# Patient Record
Sex: MALE | Race: WHITE | Employment: FULL TIME | URBAN - METROPOLITAN AREA
[De-identification: names, ages, dates, MRNs, and addresses within clinical notes are randomized per-mention and may not be internally consistent; named-entity substitution may affect disease eponyms.]

---

## 2020-02-02 ENCOUNTER — HOSPITAL ENCOUNTER (EMERGENCY)
Age: 30
Discharge: HOME | End: 2020-02-02
Attending: SPECIALIST
Payer: COMMERCIAL

## 2020-02-02 VITALS
HEIGHT: 76 IN | TEMPERATURE: 100.4 F | WEIGHT: 240 LBS | HEART RATE: 109 BPM | BODY MASS INDEX: 29.22 KG/M2 | OXYGEN SATURATION: 96 % | SYSTOLIC BLOOD PRESSURE: 135 MMHG | RESPIRATION RATE: 14 BRPM | DIASTOLIC BLOOD PRESSURE: 87 MMHG

## 2020-02-02 LAB
-: ABNORMAL
ABSOLUTE EOS #: 0 K/UL (ref 0–0.4)
ABSOLUTE IMMATURE GRANULOCYTE: ABNORMAL K/UL (ref 0–0.3)
ABSOLUTE LYMPH #: 1 K/UL (ref 1–4.8)
ABSOLUTE MONO #: 1.2 K/UL (ref 0.1–1.2)
ALBUMIN SERPL-MCNC: 4 G/DL (ref 3.5–5.2)
ALBUMIN/GLOBULIN RATIO: 1.1 (ref 1–2.5)
ALP BLD-CCNC: 60 U/L (ref 40–129)
ALT SERPL-CCNC: 16 U/L (ref 5–41)
AMORPHOUS: ABNORMAL
ANION GAP SERPL CALCULATED.3IONS-SCNC: 14 MMOL/L (ref 9–17)
AST SERPL-CCNC: 15 U/L
BACTERIA: ABNORMAL
BASOPHILS # BLD: 0 % (ref 0–2)
BASOPHILS ABSOLUTE: 0 K/UL (ref 0–0.2)
BILIRUB SERPL-MCNC: 1.06 MG/DL (ref 0.3–1.2)
BILIRUBIN URINE: NEGATIVE
BUN BLDV-MCNC: 9 MG/DL (ref 6–20)
BUN/CREAT BLD: ABNORMAL (ref 9–20)
CALCIUM SERPL-MCNC: 8.9 MG/DL (ref 8.6–10.4)
CASTS UA: ABNORMAL /LPF
CHLORIDE BLD-SCNC: 102 MMOL/L (ref 98–107)
CO2: 21 MMOL/L (ref 20–31)
COLOR: YELLOW
COMMENT UA: ABNORMAL
CREAT SERPL-MCNC: 1.17 MG/DL (ref 0.7–1.2)
CRYSTALS, UA: ABNORMAL /HPF
DIFFERENTIAL TYPE: ABNORMAL
DIRECT EXAM: NORMAL
EOSINOPHILS RELATIVE PERCENT: 0 % (ref 1–4)
EPITHELIAL CELLS UA: ABNORMAL /HPF (ref 0–5)
GFR AFRICAN AMERICAN: >60 ML/MIN
GFR NON-AFRICAN AMERICAN: >60 ML/MIN
GFR SERPL CREATININE-BSD FRML MDRD: ABNORMAL ML/MIN/{1.73_M2}
GFR SERPL CREATININE-BSD FRML MDRD: ABNORMAL ML/MIN/{1.73_M2}
GLUCOSE BLD-MCNC: 119 MG/DL (ref 70–99)
GLUCOSE URINE: NEGATIVE
HCT VFR BLD CALC: 48.1 % (ref 41–53)
HEMOGLOBIN: 16 G/DL (ref 13.5–17.5)
IMMATURE GRANULOCYTES: ABNORMAL %
KETONES, URINE: ABNORMAL
LEUKOCYTE ESTERASE, URINE: NEGATIVE
LYMPHOCYTES # BLD: 11 % (ref 24–44)
Lab: NORMAL
MCH RBC QN AUTO: 26.8 PG (ref 26–34)
MCHC RBC AUTO-ENTMCNC: 33.2 G/DL (ref 31–37)
MCV RBC AUTO: 80.6 FL (ref 80–100)
MONOCYTES # BLD: 13 % (ref 2–11)
MUCUS: ABNORMAL
NITRITE, URINE: NEGATIVE
NRBC AUTOMATED: ABNORMAL PER 100 WBC
OTHER OBSERVATIONS UA: ABNORMAL
PDW BLD-RTO: 13.8 % (ref 12.5–15.4)
PH UA: 6 (ref 5–8)
PLATELET # BLD: 227 K/UL (ref 140–450)
PLATELET ESTIMATE: ABNORMAL
PMV BLD AUTO: 9.2 FL (ref 6–12)
POTASSIUM SERPL-SCNC: 3.7 MMOL/L (ref 3.7–5.3)
PROTEIN UA: ABNORMAL
RBC # BLD: 5.97 M/UL (ref 4.5–5.9)
RBC # BLD: ABNORMAL 10*6/UL
RBC UA: ABNORMAL /HPF (ref 0–2)
RENAL EPITHELIAL, UA: ABNORMAL /HPF
SEG NEUTROPHILS: 76 % (ref 36–66)
SEGMENTED NEUTROPHILS ABSOLUTE COUNT: 7.3 K/UL (ref 1.8–7.7)
SODIUM BLD-SCNC: 137 MMOL/L (ref 135–144)
SPECIFIC GRAVITY UA: 1.01 (ref 1–1.03)
SPECIMEN DESCRIPTION: NORMAL
TOTAL PROTEIN: 7.8 G/DL (ref 6.4–8.3)
TRICHOMONAS: ABNORMAL
TURBIDITY: CLEAR
URINE HGB: ABNORMAL
UROBILINOGEN, URINE: NORMAL
WBC # BLD: 9.6 K/UL (ref 3.5–11)
WBC # BLD: ABNORMAL 10*3/UL
WBC UA: ABNORMAL /HPF (ref 0–5)
YEAST: ABNORMAL

## 2020-02-02 PROCEDURE — 36415 COLL VENOUS BLD VENIPUNCTURE: CPT

## 2020-02-02 PROCEDURE — 96374 THER/PROPH/DIAG INJ IV PUSH: CPT

## 2020-02-02 PROCEDURE — 96361 HYDRATE IV INFUSION ADD-ON: CPT

## 2020-02-02 PROCEDURE — 85025 COMPLETE CBC W/AUTO DIFF WBC: CPT

## 2020-02-02 PROCEDURE — 81001 URINALYSIS AUTO W/SCOPE: CPT

## 2020-02-02 PROCEDURE — 2580000003 HC RX 258: Performed by: SPECIALIST

## 2020-02-02 PROCEDURE — 99284 EMERGENCY DEPT VISIT MOD MDM: CPT

## 2020-02-02 PROCEDURE — 80053 COMPREHEN METABOLIC PANEL: CPT

## 2020-02-02 PROCEDURE — 6360000002 HC RX W HCPCS: Performed by: SPECIALIST

## 2020-02-02 PROCEDURE — 87804 INFLUENZA ASSAY W/OPTIC: CPT

## 2020-02-02 RX ORDER — 0.9 % SODIUM CHLORIDE 0.9 %
1000 INTRAVENOUS SOLUTION INTRAVENOUS ONCE
Status: COMPLETED | OUTPATIENT
Start: 2020-02-02 | End: 2020-02-02

## 2020-02-02 RX ORDER — ONDANSETRON 2 MG/ML
4 INJECTION INTRAMUSCULAR; INTRAVENOUS ONCE
Status: COMPLETED | OUTPATIENT
Start: 2020-02-02 | End: 2020-02-02

## 2020-02-02 RX ORDER — ONDANSETRON 4 MG/1
4 TABLET, ORALLY DISINTEGRATING ORAL EVERY 8 HOURS PRN
Qty: 12 TABLET | Refills: 0 | Status: SHIPPED | OUTPATIENT
Start: 2020-02-02 | End: 2021-06-02

## 2020-02-02 RX ADMIN — SODIUM CHLORIDE 1000 ML: 9 INJECTION, SOLUTION INTRAVENOUS at 15:00

## 2020-02-02 RX ADMIN — ONDANSETRON 4 MG: 2 INJECTION INTRAMUSCULAR; INTRAVENOUS at 13:15

## 2020-02-02 RX ADMIN — SODIUM CHLORIDE 1000 ML: 9 INJECTION, SOLUTION INTRAVENOUS at 13:14

## 2020-02-02 ASSESSMENT — PAIN DESCRIPTION - LOCATION: LOCATION: ABDOMEN

## 2020-02-02 ASSESSMENT — ENCOUNTER SYMPTOMS
DIARRHEA: 1
ABDOMINAL PAIN: 1
BLOOD IN STOOL: 0
VOMITING: 0
NAUSEA: 1

## 2020-02-02 ASSESSMENT — PAIN SCALES - GENERAL: PAINLEVEL_OUTOF10: 5

## 2020-02-02 ASSESSMENT — PAIN DESCRIPTION - PAIN TYPE: TYPE: ACUTE PAIN

## 2020-02-02 ASSESSMENT — PAIN DESCRIPTION - DESCRIPTORS: DESCRIPTORS: CRAMPING

## 2020-02-02 NOTE — ED PROVIDER NOTES
film images such as CT, Ultrasound and MRI are read by the radiologist. Plain radiographic images are visualized and the radiologist interpretations are reviewed as follows: No results found. LABS:  Results for orders placed or performed during the hospital encounter of 02/02/20   Rapid Influenza A/B Antigens   Result Value Ref Range    Specimen Description . NASOPHARYNGEAL SWAB     Special Requests NOT REPORTED     Direct Exam       PRESUMPTIVE NEGATIVE for Influenza A + B antigens. PCR testing to confirm this result is available upon request.  Specimen will be saved in the laboratory for 7 days. Please call [phone] if PCR testing is indicated.    CBC Auto Differential   Result Value Ref Range    WBC 9.6 3.5 - 11.0 k/uL    RBC 5.97 (H) 4.5 - 5.9 m/uL    Hemoglobin 16.0 13.5 - 17.5 g/dL    Hematocrit 48.1 41 - 53 %    MCV 80.6 80 - 100 fL    MCH 26.8 26 - 34 pg    MCHC 33.2 31 - 37 g/dL    RDW 13.8 12.5 - 15.4 %    Platelets 413 549 - 155 k/uL    MPV 9.2 6.0 - 12.0 fL    NRBC Automated NOT REPORTED per 100 WBC    Differential Type NOT REPORTED     Seg Neutrophils 76 (H) 36 - 66 %    Lymphocytes 11 (L) 24 - 44 %    Monocytes 13 (H) 2 - 11 %    Eosinophils % 0 (L) 1 - 4 %    Basophils 0 0 - 2 %    Immature Granulocytes NOT REPORTED 0 %    Segs Absolute 7.30 1.8 - 7.7 k/uL    Absolute Lymph # 1.00 1.0 - 4.8 k/uL    Absolute Mono # 1.20 0.1 - 1.2 k/uL    Absolute Eos # 0.00 0.0 - 0.4 k/uL    Basophils Absolute 0.00 0.0 - 0.2 k/uL    Absolute Immature Granulocyte NOT REPORTED 0.00 - 0.30 k/uL    WBC Morphology NOT REPORTED     RBC Morphology NOT REPORTED     Platelet Estimate NOT REPORTED    Comprehensive Metabolic Panel w/ Reflex to MG   Result Value Ref Range    Glucose 119 (H) 70 - 99 mg/dL    BUN 9 6 - 20 mg/dL    CREATININE 1.17 0.70 - 1.20 mg/dL    Bun/Cre Ratio NOT REPORTED 9 - 20    Calcium 8.9 8.6 - 10.4 mg/dL    Sodium 137 135 - 144 mmol/L    Potassium 3.7 3.7 - 5.3 mmol/L    Chloride 102 98 - 107 mmol/L    CO2 21 20 - 31 mmol/L    Anion Gap 14 9 - 17 mmol/L    Alkaline Phosphatase 60 40 - 129 U/L    ALT 16 5 - 41 U/L    AST 15 <40 U/L    Total Bilirubin 1.06 0.3 - 1.2 mg/dL    Total Protein 7.8 6.4 - 8.3 g/dL    Alb 4.0 3.5 - 5.2 g/dL    Albumin/Globulin Ratio 1.1 1.0 - 2.5    GFR Non-African American >60 >60 mL/min    GFR African American >60 >60 mL/min    GFR Comment          GFR Staging NOT REPORTED    Urinalysis Reflex to Culture   Result Value Ref Range    Color, UA YELLOW YELLOW    Turbidity UA CLEAR CLEAR    Glucose, Ur NEGATIVE NEGATIVE    Bilirubin Urine NEGATIVE NEGATIVE    Ketones, Urine SMALL (A) NEGATIVE    Specific Gravity, UA 1.006 1.005 - 1.030    Urine Hgb MODERATE (A) NEGATIVE    pH, UA 6.0 5.0 - 8.0    Protein, UA 1+ (A) NEGATIVE    Urobilinogen, Urine Normal Normal    Nitrite, Urine NEGATIVE NEGATIVE    Leukocyte Esterase, Urine NEGATIVE NEGATIVE    Urinalysis Comments NOT REPORTED    Microscopic Urinalysis   Result Value Ref Range    -          WBC, UA 2 TO 5 0 - 5 /HPF    RBC, UA 5 TO 10 0 - 2 /HPF    Casts UA NOT REPORTED /LPF    Crystals UA NOT REPORTED None /HPF    Epithelial Cells UA None 0 - 5 /HPF    Renal Epithelial, Urine NOT REPORTED 0 /HPF    Bacteria, UA FEW (A) None    Mucus, UA NOT REPORTED None    Trichomonas, UA NOT REPORTED None    Amorphous, UA 1+ (A) None    Other Observations UA (A) NOT REQ. Utilizing a urinalysis as the only screening method to exclude a potential uropathogen can be unreliable in many patient populations. Rapid screening tests are less sensitive than culture and if UTI is a clinical possibility, culture should be considered despite a negative urinalysis.     Yeast, UA NOT REPORTED None       I reviewed all the lab results and these are unremarkable    EMERGENCY DEPARTMENT COURSE:   Vitals:    Vitals:    02/02/20 1208   BP: 135/87   Pulse: 109   Resp: 14   Temp: 100.4 °F (38 °C)   TempSrc: Oral   SpO2: 96%   Weight:

## 2020-02-02 NOTE — ED NOTES
Patient cleared for discharge. Patient discharge instructions explained, Rx given and explained to patient. Patient verbalized understanding of all instructions and all patient questions answered to their satisfaction. Patient departs in stable condition.         Miranda Moritz, RN  02/02/20 2527

## 2020-02-02 NOTE — ED NOTES
Pt ambulated to room 7. C/o diarrhea greater than 50 times over last 2 days. Pt reports decreased diarrhea recently but is concerned because of the magnitude of diarrhea he has had. Pt also reports abd cramping and feeling dehydrated. Pt alert and oriented speaking sentences no distress noted.       Lexa Haywodo RN  02/02/20 9529

## 2020-07-30 ENCOUNTER — HOSPITAL ENCOUNTER (OUTPATIENT)
Age: 30
Setting detail: SPECIMEN
Discharge: HOME | End: 2020-07-30
Payer: COMMERCIAL

## 2020-07-30 ENCOUNTER — OFFICE VISIT (OUTPATIENT)
Dept: PRIMARY CARE CLINIC | Age: 30
End: 2020-07-30
Payer: COMMERCIAL

## 2020-07-30 VITALS
BODY MASS INDEX: 30.46 KG/M2 | HEIGHT: 77 IN | OXYGEN SATURATION: 97 % | WEIGHT: 258 LBS | HEART RATE: 94 BPM | TEMPERATURE: 98.1 F

## 2020-07-30 PROCEDURE — 99203 OFFICE O/P NEW LOW 30 MIN: CPT | Performed by: FAMILY MEDICINE

## 2020-07-30 ASSESSMENT — PATIENT HEALTH QUESTIONNAIRE - PHQ9
1. LITTLE INTEREST OR PLEASURE IN DOING THINGS: 0
SUM OF ALL RESPONSES TO PHQ QUESTIONS 1-9: 0
SUM OF ALL RESPONSES TO PHQ9 QUESTIONS 1 & 2: 0
2. FEELING DOWN, DEPRESSED OR HOPELESS: 0
SUM OF ALL RESPONSES TO PHQ QUESTIONS 1-9: 0

## 2020-07-30 ASSESSMENT — ENCOUNTER SYMPTOMS
WHEEZING: 0
NAUSEA: 0
SORE THROAT: 1
COUGH: 1
RHINORRHEA: 0
DIARRHEA: 0
SHORTNESS OF BREATH: 0
VOMITING: 0
ABDOMINAL PAIN: 0

## 2020-07-30 NOTE — PROGRESS NOTES
[address] [place] [address] CLINIC  [address] [facility]  [address] [facility] [address] [address]  Dept: [phone]  Dept Fax: [phone]    Worthy Homans is a 27 y.o. male who presents today for his medical conditions/complaintsas noted below. Worthy Homans is c/o of Concern For COVID-19 (Sore throat, cough, body aches x 1 week)        HPI:     Cough   This is a new problem. The current episode started 1 to 4 weeks ago (1 week). The problem has been unchanged. The problem occurs constantly. The cough is non-productive. Associated symptoms include myalgias and a sore throat. Pertinent negatives include no chills, ear pain, fever, headaches, nasal congestion, rash, rhinorrhea, shortness of breath or wheezing. Nothing aggravates the symptoms. Treatments tried: zyrtec, motrin. The treatment provided mild relief. There is no history of asthma, COPD or environmental allergies. No significant PMHx  Reports that he had been around someone that tested positive for COVID last week    History reviewed. No pertinent past medical history. Past Surgical History:   Procedure Laterality Date    APPENDECTOMY      HERNIA REPAIR Left     inguinal hernia repair   Past medical history reviewed and pertinent positives/negatives in the HPI    History reviewed. No pertinent family history. Social History     Tobacco Use    Smoking status: Never Smoker    Smokeless tobacco: Current User   Substance Use Topics    Alcohol use: Yes     Comment: socially      Current Outpatient Medications   Medication Sig Dispense Refill    ondansetron (ZOFRAN ODT) 4 MG disintegrating tablet Take 1 tablet by mouth every 8 hours as needed for Nausea 12 tablet 0     No current facility-administered medications for this visit.       No Known Allergies    Health Maintenance   Topic Date Due    Varicella vaccine (1 of 2 - 2-dose childhood series) 02/09/1991    HIV screen  02/09/2005    DTaP/Tdap/Td vaccine (1 - Tdap) 02/09/2009    Flu vaccine (1) 09/01/2020    Hepatitis A vaccine  Aged Out    Hepatitis B vaccine  Aged Out    Hib vaccine  Aged Out    Meningococcal (ACWY) vaccine  Aged Out    Pneumococcal 0-64 years Vaccine  Aged Out       :      Review of Systems   Constitutional: Negative for chills and fever. HENT: Positive for sore throat. Negative for congestion, ear pain and rhinorrhea. Respiratory: Positive for cough. Negative for shortness of breath and wheezing. Gastrointestinal: Negative for abdominal pain, diarrhea, nausea and vomiting. Musculoskeletal: Positive for myalgias. Skin: Negative for pallor and rash. Allergic/Immunologic: Negative for environmental allergies. Neurological: Negative for headaches. Hematological: Negative for adenopathy. Objective:   Patient was evaluated [place] today during this pandemic covid 19 situation. Physical Exam  Vitals signs and nursing note reviewed. Constitutional:       Appearance: Normal appearance. HENT:      Head: Normocephalic and atraumatic. Right Ear: Hearing normal.      Left Ear: Hearing normal.      Nose: Nose normal.      Mouth/Throat:      Lips: Pink. Mouth: Mucous membranes are moist.      Pharynx: Posterior oropharyngeal erythema present. No pharyngeal swelling. Tonsils: No tonsillar exudate. Eyes:      Extraocular Movements: Extraocular movements intact. Conjunctiva/sclera: Conjunctivae normal.   Neck:      Musculoskeletal: Normal range of motion. No muscular tenderness. Cardiovascular:      Rate and Rhythm: Normal rate and regular rhythm. Heart sounds: Normal heart sounds. Pulmonary:      Effort: Pulmonary effort is normal.      Breath sounds: Normal breath sounds. Lymphadenopathy:      Cervical: No cervical adenopathy. Skin:     General: Skin is warm and dry. Neurological:      Mental Status: He is alert and oriented to person, place, and time. Mental status is at baseline.    Psychiatric:         Mood and Affect: Mood normal.         Behavior: Behavior normal.         Thought Content: Thought content normal.         Judgment: Judgment normal.       Pulse 94   Temp 98.1 °F (36.7 °C) (Tympanic)   Ht 6' 5\" (1.956 m)   Wt 258 lb (117 kg)   SpO2 97%   BMI 30.59 kg/m²     Assessment:       Diagnosis Orders   1. Suspected COVID-19 virus infection  COVID-19 Ambulatory   2. Viral illness         Plan: You were tested for COVID today. We will call you with results when they are available. If you have not heard from us in 7 days, please call our office. Advance Care Planning  People with COVID-19 may have no symptoms, mild symptoms, such as fever, cough, and shortness of breath or they may have more severe illness, developing severe and fatal pneumonia. As a result, Advance Care Planning with attention to naming a health care decision maker (someone you trust to make healthcare decisions for you if you could not speak for yourself) and sharing other health care preferences is important BEFORE a possible health crisis. Please contact your Primary Care Provider to discuss Advance Care Planning. Preventing the Spread of Coronavirus Disease 2019 in Homes and Residential Communities  For the most recent information go to [email or web address]    Prevention steps for People with confirmed or suspected COVID-19 (including persons under investigation) who do not need to be hospitalized  and   People with confirmed COVID-19 who were hospitalized and determined to be medically stable to go home    Your healthcare provider and public health staff will evaluate whether you can be cared for at home. If it is determined that you do not need to be hospitalized and can be isolated at home, you will be monitored by staff from your local or state health department.  You should follow the prevention steps below until a healthcare provider or local or state health department says you can return to your normal activities. Stay home except to get medical care  People who are mildly ill with COVID-19 are able to isolate at home during their illness. You should restrict activities outside your home, except for getting medical care. Do not go to work, school, or public areas. Avoid using public transportation, ride-sharing, or taxis. Separate yourself from other people and animals in your home  People: As much as possible, you should stay in a specific room and away from other people in your home. Also, you should use a separate bathroom, if available. Animals: You should restrict contact with pets and other animals while you are sick with COVID-19, just like you would around other people. Although there have not been reports of pets or other animals becoming sick with COVID-19, it is still recommended that people sick with COVID-19 limit contact with animals until more information is known about the virus. When possible, have another member of your household care for your animals while you are sick. If you are sick with COVID-19, avoid contact with your pet, including petting, snuggling, being kissed or licked, and sharing food. If you must care for your pet or be around animals while you are sick, wash your hands before and after you interact with pets and wear a facemask. Call ahead before visiting your doctor  If you have a medical appointment, call the healthcare provider and tell them that you have or may have COVID-19. This will help the healthcare providers office take steps to keep other people from getting infected or exposed. Wear a facemask  You should wear a facemask when you are around other people (e.g., sharing a room or vehicle) or pets and before you enter a healthcare providers office.  If you are not able to wear a facemask (for example, because it causes trouble breathing), then people who live with you should not stay in the same room with you, or they should wear a facemask if they enter your room. Cover your coughs and sneezes  Cover your mouth and nose with a tissue when you cough or sneeze. Throw used tissues in a lined trash can. Immediately wash your hands with soap and water for at least 20 seconds or, if soap and water are not available, clean your hands with an alcohol-based hand  that contains at least 60% alcohol. Clean your hands often  Wash your hands often with soap and water for at least 20 seconds, especially after blowing your nose, coughing, or sneezing; going to the bathroom; and before eating or preparing food. If soap and water are not readily available, use an alcohol-based hand  with at least 60% alcohol, covering all surfaces of your hands and rubbing them together until they feel dry. Soap and water are the best option if hands are visibly dirty. Avoid touching your eyes, nose, and mouth with unwashed hands. Avoid sharing personal household items  You should not share dishes, drinking glasses, cups, eating utensils, towels, or bedding with other people or pets in your home. After using these items, they should be washed thoroughly with soap and water. Clean all high-touch surfaces everyday  High touch surfaces include counters, tabletops, doorknobs, bathroom fixtures, toilets, phones, keyboards, tablets, and bedside tables. Also, clean any surfaces that may have blood, stool, or body fluids on them. Use a household cleaning spray or wipe, according to the label instructions. Labels contain instructions for safe and effective use of the cleaning product including precautions you should take when applying the product, such as wearing gloves and making sure you have good ventilation during use of the product. Monitor your symptoms  Seek prompt medical attention if your illness is worsening (e.g., difficulty breathing).  Before seeking care, call your healthcare provider and tell them that you have, or are being evaluated for, COVID-19. Put on a facemask before you enter the facility. These steps will help the healthcare providers office to keep other people in the office or waiting room from getting infected or exposed. Ask your healthcare provider to call the local or state health department. Persons who are placed under active monitoring or facilitated self-monitoring should follow instructions provided by their local health department or occupational health professionals, as appropriate. When working with your local health department check their available hours. If you have a medical emergency and need to call 911, notify the dispatch personnel that you have, or are being evaluated for COVID-19. If possible, put on a facemask before emergency medical services arrive. Discontinuing home isolation  Patients with confirmed COVID-19 should remain under home isolation precautions until the risk of secondary transmission to others is thought to be low. The decision to discontinue home isolation precautions should be made on a case-by-case basis, in consultation with healthcare providers and [facility] and [facility] health departments. Orders Placed This Encounter   Procedures    COVID-19 Ambulatory     Oropharyngeal     Standing Status:   Future     Standing Expiration Date:   7/30/2021     Scheduling Instructions:      Saline media preferred given current shortage of viral transport media but both acceptable     Order Specific Question:   Status     Answer:   Symptomatic/Infection Suspected     No orders of the defined types were placed in this encounter. Patient given educational materials - see patient instructions. Discussed use, benefit, and side effects of prescribed medications. All patient questions answered. Pt voiced understanding. Patient agreed with treatment plan. Follow up as directed.      Electronicallysigned by Jun Dominguez MD on 7/30/2020 at 8:42 AM

## 2020-07-30 NOTE — PATIENT INSTRUCTIONS
You were tested for COVID today. We will call you with results when they are available. If you have not heard from us in 7 days, please call our office. Advance Care Planning  People with COVID-19 may have no symptoms, mild symptoms, such as fever, cough, and shortness of breath or they may have more severe illness, developing severe and fatal pneumonia. As a result, Advance Care Planning with attention to naming a health care decision maker (someone you trust to make healthcare decisions for you if you could not speak for yourself) and sharing other health care preferences is important BEFORE a possible health crisis. Please contact your Primary Care Provider to discuss Advance Care Planning. Preventing the Spread of Coronavirus Disease 2019 in Homes and Residential Communities  For the most recent information go to [employer]    Prevention steps for People with confirmed or suspected COVID-19 (including persons under investigation) who do not need to be hospitalized  and   People with confirmed COVID-19 who were hospitalized and determined to be medically stable to go home    Your healthcare provider and public health staff will evaluate whether you can be cared for at home. If it is determined that you do not need to be hospitalized and can be isolated at home, you will be monitored by staff from your local or state health department. You should follow the prevention steps below until a healthcare provider or local or state health department says you can return to your normal activities. Stay home except to get medical care  People who are mildly ill with COVID-19 are able to isolate at home during their illness. You should restrict activities outside your home, except for getting medical care. Do not go to work, school, or public areas. Avoid using public transportation, ride-sharing, or taxis.   Separate yourself from other people and animals in your home  People: As much as possible, you should stay in a specific room and away from other people in your home. Also, you should use a separate bathroom, if available. Animals: You should restrict contact with pets and other animals while you are sick with COVID-19, just like you would around other people. Although there have not been reports of pets or other animals becoming sick with COVID-19, it is still recommended that people sick with COVID-19 limit contact with animals until more information is known about the virus. When possible, have another member of your household care for your animals while you are sick. If you are sick with COVID-19, avoid contact with your pet, including petting, snuggling, being kissed or licked, and sharing food. If you must care for your pet or be around animals while you are sick, wash your hands before and after you interact with pets and wear a facemask. Call ahead before visiting your doctor  If you have a medical appointment, call the healthcare provider and tell them that you have or may have COVID-19. This will help the healthcare providers office take steps to keep other people from getting infected or exposed. Wear a facemask  You should wear a facemask when you are around other people (e.g., sharing a room or vehicle) or pets and before you enter a healthcare providers office. If you are not able to wear a facemask (for example, because it causes trouble breathing), then people who live with you should not stay in the same room with you, or they should wear a facemask if they enter your room. Cover your coughs and sneezes  Cover your mouth and nose with a tissue when you cough or sneeze. Throw used tissues in a lined trash can. Immediately wash your hands with soap and water for at least 20 seconds or, if soap and water are not available, clean your hands with an alcohol-based hand  that contains at least 60% alcohol.   Clean your hands

## 2020-08-02 LAB — SARS-COV-2, NAA: NOT DETECTED

## 2021-06-02 ENCOUNTER — OFFICE VISIT (OUTPATIENT)
Dept: FAMILY MEDICINE CLINIC | Age: 31
End: 2021-06-02
Payer: COMMERCIAL

## 2021-06-02 ENCOUNTER — HOSPITAL ENCOUNTER (OUTPATIENT)
Dept: GENERAL RADIOLOGY | Age: 31
Discharge: HOME | End: 2021-06-04
Payer: COMMERCIAL

## 2021-06-02 ENCOUNTER — HOSPITAL ENCOUNTER (OUTPATIENT)
Age: 31
Discharge: HOME | End: 2021-06-04
Payer: COMMERCIAL

## 2021-06-02 VITALS
RESPIRATION RATE: 16 BRPM | BODY MASS INDEX: 26.8 KG/M2 | HEART RATE: 73 BPM | DIASTOLIC BLOOD PRESSURE: 78 MMHG | WEIGHT: 227 LBS | SYSTOLIC BLOOD PRESSURE: 118 MMHG | HEIGHT: 77 IN | TEMPERATURE: 99 F

## 2021-06-02 DIAGNOSIS — R07.81 RIB PAIN ON LEFT SIDE: ICD-10-CM

## 2021-06-02 DIAGNOSIS — S30.0XXA CONTUSION OF LOWER BACK, INITIAL ENCOUNTER: Primary | ICD-10-CM

## 2021-06-02 DIAGNOSIS — M79.10 MUSCLE PAIN: ICD-10-CM

## 2021-06-02 DIAGNOSIS — S30.0XXA CONTUSION OF LOWER BACK, INITIAL ENCOUNTER: ICD-10-CM

## 2021-06-02 PROCEDURE — 99214 OFFICE O/P EST MOD 30 MIN: CPT | Performed by: NURSE PRACTITIONER

## 2021-06-02 PROCEDURE — 71046 X-RAY EXAM CHEST 2 VIEWS: CPT

## 2021-06-02 RX ORDER — CYCLOBENZAPRINE HCL 10 MG
10 TABLET ORAL 2 TIMES DAILY PRN
Qty: 10 TABLET | Refills: 0 | Status: SHIPPED | OUTPATIENT
Start: 2021-06-02 | End: 2021-07-02 | Stop reason: SDUPTHER

## 2021-06-02 ASSESSMENT — PATIENT HEALTH QUESTIONNAIRE - PHQ9
2. FEELING DOWN, DEPRESSED OR HOPELESS: 0
SUM OF ALL RESPONSES TO PHQ QUESTIONS 1-9: 0
SUM OF ALL RESPONSES TO PHQ QUESTIONS 1-9: 0
SUM OF ALL RESPONSES TO PHQ9 QUESTIONS 1 & 2: 0
1. LITTLE INTEREST OR PLEASURE IN DOING THINGS: 0
SUM OF ALL RESPONSES TO PHQ QUESTIONS 1-9: 0

## 2021-06-02 ASSESSMENT — ENCOUNTER SYMPTOMS
SHORTNESS OF BREATH: 0
VOMITING: 0
BACK PAIN: 1
WHEEZING: 0
NAUSEA: 0
COLOR CHANGE: 0

## 2021-06-02 NOTE — PROGRESS NOTES
[address] WALK-IN  [address] [facility] [address]  Dept: [phone]  Dept Fax: [phone]    Annie Jang is a 32 y.o. male who presents today for his medical conditions/complaints of   Chief Complaint   Patient presents with    Lower Back Pain     about 2 weeks           HPI:     /78   Pulse 73   Temp 99 °F (37.2 °C) (Temporal)   Resp 16   Ht 6' 5\" (1.956 m)   Wt 227 lb (103 kg)   BMI 26.92 kg/m²       HPI   Patient presented to the urgent care with c/o left side rib back pain x 14 days. This is a new problem. The pain occurs with movement. The problem is unchanged. The pain is present in the left rib area. The quality of the pain is described as aching (tense). Sharp with movement. The pain does radiate at times into the shoulder. The pain is at a severity of 7/10. The pain is moderate. The symptoms are aggravated by movement, coughing. Associated symptoms include:  None. Pertinent negatives include no bladder incontinence, bowel incontinence, fever, headaches, numbness, tingling, dysuria. Risk factors: Injured during Jitisu. Pt has tried Tylenol, Motrin and evaluation from Chiropractor for the symptoms. The treatment provided some relief. Denies any previous injury to the back. Fell on angle on shin of partner during Jitisu. Has been able to continue to work out at the gym. Has been taking off work due to his discomfort. History reviewed. No pertinent past medical history. Past Surgical History:   Procedure Laterality Date    APPENDECTOMY      HERNIA REPAIR Left     inguinal hernia repair       History reviewed. No pertinent family history. Social History     Tobacco Use    Smoking status: Never Smoker    Smokeless tobacco: Current User   Substance Use Topics    Alcohol use: Yes     Comment: socially        Prior to Visit Medications    Medication Sig Taking?  Authorizing Provider   cyclobenzaprine (FLEXERIL) 10 MG tablet Take 1 tablet by mouth 2 times daily as needed for Muscle spasms Yes Lauren Bright, APRN - CNP       No Known Allergies      Subjective:      Review of Systems   Constitutional: Negative for chills and fever. Respiratory: Negative for shortness of breath and wheezing. Gastrointestinal: Negative for nausea and vomiting. Musculoskeletal: Positive for back pain. Negative for neck stiffness. Skin: Negative for color change. Neurological: Negative for weakness, light-headedness, numbness and headaches. Psychiatric/Behavioral: Negative for sleep disturbance. Objective:     Physical Exam  Vitals and nursing note reviewed. Constitutional:       General: He is not in acute distress. Appearance: Normal appearance. HENT:      Head: Normocephalic and atraumatic. Right Ear: Ear canal and external ear normal.      Left Ear: Ear canal and external ear normal.      Nose: Nose normal.      Mouth/Throat:      Mouth: Mucous membranes are moist.   Eyes:      Extraocular Movements: Extraocular movements intact. Conjunctiva/sclera: Conjunctivae normal.   Cardiovascular:      Rate and Rhythm: Normal rate and regular rhythm. Pulses: Normal pulses. Pulmonary:      Effort: Pulmonary effort is normal.      Breath sounds: Normal breath sounds. Abdominal:      General: Bowel sounds are normal.      Palpations: Abdomen is soft. Musculoskeletal:         General: Normal range of motion. Cervical back: Normal range of motion and neck supple. No bony tenderness. Thoracic back: No bony tenderness. Lumbar back: No bony tenderness. Negative right straight leg raise test and negative left straight leg raise test.        Back:    Skin:     General: Skin is warm and dry. Capillary Refill: Capillary refill takes less than 2 seconds. Findings: No bruising. Neurological:      Mental Status: He is alert and oriented to person, place, and time. Sensory: Sensation is intact. Motor: Motor function is intact. Coordination: Coordination normal.      Gait: Gait normal.   Psychiatric:         Mood and Affect: Mood normal.         Thought Content: Thought content normal.           MEDICAL DECISION MAKING Assessment/Plan:     China Gayle was seen today for lower back pain. Diagnoses and all orders for this visit:    Contusion of lower back, initial encounter  -     XR CHEST (2 VW); Future    Rib pain on left side  -     XR CHEST (2 VW); Future    Muscle pain  -     XR CHEST (2 VW); Future  -     cyclobenzaprine (FLEXERIL) 10 MG tablet; Take 1 tablet by mouth 2 times daily as needed for Muscle spasms        Results for orders placed or performed during the hospital encounter of 07/30/20   COVID-19 Ambulatory    Specimen: Nasopharynx/Oropharynx   Result Value Ref Range    SARS-CoV-2, FELIX Not Detected Not Detected     The patient presents with low back pain without signs of spinal cord compression, cauda equina syndrome, infection, aneurysm or other serious etiology. The patient is neurologically intact. Given the extremely low risk of these diagnoses further testing and evaluation for these possibilities does not appear to be indicated at this time. Will check CXR for further evaluation and to r/o rib fracture. Will call with results. Continue on Motrin. Script provided for Flexeril. Advised that medication may make him sleepy and to use with caution. Controlled Substance Monitoring:    Acute and Chronic Pain Monitoring:   RX Monitoring 6/2/2021   Periodic Controlled Substance Monitoring No signs of potential drug abuse or diversion identified. May use ice/warmth for comfort. CXR REVIEWED:    FINDINGS:  The lungs are without acute focal process. No effusion or pneumothorax.  The  cardiomediastinal silhouette is normal.  The osseous structures are intact  without acute process.     IMPRESSION:  Unremarkable chest.    The patient has been instructed to return if the symptoms worsen or change in any way. Pt does not have primary care provider. Provided names of primary care providers at [address] [place] [address] to set up appointment to establish care, follow up and for routine health maintenance. Patient given educational materials - see patientinstructions. Discussed use, benefit, and side effects of prescribed medications. All patient questions answered. Pt verbalized understanding. Instructed to continue current medications, diet and exercise. Patient agreed with treatment plan. Follow up as directed.      Electronically signed by KERRY Shoemaker CNP on 6/2/2021 at 10:29 AM

## 2021-06-07 ENCOUNTER — OFFICE VISIT (OUTPATIENT)
Dept: PRIMARY CARE CLINIC | Age: 31
End: 2021-06-07
Payer: COMMERCIAL

## 2021-06-07 ENCOUNTER — TELEPHONE (OUTPATIENT)
Dept: PRIMARY CARE CLINIC | Age: 31
End: 2021-06-07

## 2021-06-07 VITALS
SYSTOLIC BLOOD PRESSURE: 118 MMHG | RESPIRATION RATE: 14 BRPM | DIASTOLIC BLOOD PRESSURE: 78 MMHG | HEIGHT: 77 IN | HEART RATE: 80 BPM | OXYGEN SATURATION: 97 % | WEIGHT: 227.2 LBS | BODY MASS INDEX: 26.83 KG/M2

## 2021-06-07 DIAGNOSIS — S20.222D CONTUSION OF LEFT SIDE OF BACK, SUBSEQUENT ENCOUNTER: ICD-10-CM

## 2021-06-07 DIAGNOSIS — Z76.89 ENCOUNTER TO ESTABLISH CARE: Primary | ICD-10-CM

## 2021-06-07 DIAGNOSIS — F90.0 ATTENTION DEFICIT HYPERACTIVITY DISORDER (ADHD), PREDOMINANTLY INATTENTIVE TYPE: ICD-10-CM

## 2021-06-07 PROCEDURE — 99204 OFFICE O/P NEW MOD 45 MIN: CPT | Performed by: PHYSICIAN ASSISTANT

## 2021-06-07 RX ORDER — DEXTROAMPHETAMINE SACCHARATE, AMPHETAMINE ASPARTATE MONOHYDRATE, DEXTROAMPHETAMINE SULFATE AND AMPHETAMINE SULFATE 2.5; 2.5; 2.5; 2.5 MG/1; MG/1; MG/1; MG/1
10 CAPSULE, EXTENDED RELEASE ORAL EVERY MORNING
Qty: 30 CAPSULE | Refills: 0 | Status: SHIPPED | OUTPATIENT
Start: 2021-06-07 | End: 2021-07-12 | Stop reason: SDUPTHER

## 2021-06-07 SDOH — ECONOMIC STABILITY: TRANSPORTATION INSECURITY
IN THE PAST 12 MONTHS, HAS THE LACK OF TRANSPORTATION KEPT YOU FROM MEDICAL APPOINTMENTS OR FROM GETTING MEDICATIONS?: NO

## 2021-06-07 SDOH — ECONOMIC STABILITY: FOOD INSECURITY: WITHIN THE PAST 12 MONTHS, YOU WORRIED THAT YOUR FOOD WOULD RUN OUT BEFORE YOU GOT MONEY TO BUY MORE.: NEVER TRUE

## 2021-06-07 SDOH — ECONOMIC STABILITY: TRANSPORTATION INSECURITY
IN THE PAST 12 MONTHS, HAS LACK OF TRANSPORTATION KEPT YOU FROM MEETINGS, WORK, OR FROM GETTING THINGS NEEDED FOR DAILY LIVING?: NO

## 2021-06-07 SDOH — ECONOMIC STABILITY: FOOD INSECURITY: WITHIN THE PAST 12 MONTHS, THE FOOD YOU BOUGHT JUST DIDN'T LAST AND YOU DIDN'T HAVE MONEY TO GET MORE.: NEVER TRUE

## 2021-06-07 ASSESSMENT — ENCOUNTER SYMPTOMS
SHORTNESS OF BREATH: 0
VOMITING: 0
DIARRHEA: 0
NAUSEA: 0
COUGH: 0
EYE PAIN: 0
CONSTIPATION: 0
ABDOMINAL PAIN: 0
RHINORRHEA: 0
SINUS PAIN: 0
BACK PAIN: 1

## 2021-06-07 ASSESSMENT — SOCIAL DETERMINANTS OF HEALTH (SDOH): HOW HARD IS IT FOR YOU TO PAY FOR THE VERY BASICS LIKE FOOD, HOUSING, MEDICAL CARE, AND HEATING?: NOT HARD AT ALL

## 2021-06-07 NOTE — TELEPHONE ENCOUNTER
Outcome  Approvedtoday  Your PA request has been approved. Additional information will be provided in the approval communication.  (Message [phone])

## 2021-06-07 NOTE — PROGRESS NOTES
[address] PRIMARY CARE  [address]  [address] [address]  Dept: [phone]  Dept Fax: [phone]    Herman Hess is a 32 y.o. male who presents today for his medical conditions/complaints as noted below. Chief Complaint   Patient presents with    Establish Care    Back Pain     lower back x 2/3 weeks     ADHD     [facility]     Discuss Medications       HPI:     Patient presents to the office to establish care. No recent PCP. He has past medical history of ADHD. He works for [employer] and obtains yearly physical. He will obtain most recent blood work. Primary concern today is back pain. He sustained localized injury during jujutsu while doing a sweeping drill when he landed on a leg. Since then he has had localized pain over the left mid-back. He was evaluated at walk-in and given Flexeril which is very effective. The pain is exacerbated with lifting, twisting, direct pressure. Denies numbness, tingling, weakness, change in bowel/bladder. He has also taken aleve and tylenol with relief. He has seen chiropractor for treatment and heat therapy. In addition has concerns for ADHD - past history of ADHD which was managed at [facility] and was taking Adderall. He describes inability to focus. Easily distracted. Constant fidgeting. He denies any past history of medication side effects. He is switching role at job which will require significant concentration on tedious tasks. Patient participates in avid workout program and monitors diet closely. I reviewed with patient his allergies, medications, past medical history, surgical history, family history, and social history. BP stable. Weight stable.       No results found for: LABA1C          ( goal A1C is < 7)   No results found for: LABMICR  No results found for: LDLCHOLESTEROL, LDLCALC    (goal LDL is <100)   AST (U/L)   Date Value   02/02/2020 15     ALT (U/L)   Date Value   02/02/2020 16 BUN (mg/dL)   Date Value   02/02/2020 9     BP Readings from Last 3 Encounters:   06/07/21 118/78   06/02/21 118/78   02/02/20 135/87          (goal 120/80)    Past Medical History:   Diagnosis Date    ADHD (attention deficit hyperactivity disorder)       Past Surgical History:   Procedure Laterality Date    APPENDECTOMY      HERNIA REPAIR Left     inguinal hernia repair       Family History   Problem Relation Age of Onset    Other Mother     High Blood Pressure Father        Social History     Tobacco Use    Smoking status: Never Smoker    Smokeless tobacco: Former User     Types: Chew   Substance Use Topics    Alcohol use: Yes     Comment: socially      Current Outpatient Medications   Medication Sig Dispense Refill    amphetamine-dextroamphetamine (ADDERALL XR) 10 MG extended release capsule Take 1 capsule by mouth every morning for 30 days. 30 capsule 0    cyclobenzaprine (FLEXERIL) 10 MG tablet Take 1 tablet by mouth 2 times daily as needed for Muscle spasms 10 tablet 0     No current facility-administered medications for this visit. No Known Allergies    Health Maintenance   Topic Date Due    Hepatitis C screen  Never done    Varicella vaccine (1 of 2 - 2-dose childhood series) Never done    COVID-19 Vaccine (1) Never done    HIV screen  Never done    DTaP/Tdap/Td vaccine (1 - Tdap) Never done    Flu vaccine (Season Ended) 09/01/2021    Hepatitis A vaccine  Aged Out    Hepatitis B vaccine  Aged Out    Hib vaccine  Aged Out    Meningococcal (ACWY) vaccine  Aged Out    Pneumococcal 0-64 years Vaccine  Aged Out       Subjective:      Review of Systems   Constitutional: Negative for chills, fatigue and fever. HENT: Negative for congestion, rhinorrhea and sinus pain. Eyes: Negative for pain. Respiratory: Negative for cough and shortness of breath. Cardiovascular: Negative for chest pain and leg swelling.    Gastrointestinal: Negative for abdominal pain, constipation, diarrhea, nausea and vomiting. Genitourinary: Negative for difficulty urinating, enuresis and testicular pain. Musculoskeletal: Positive for back pain. Negative for arthralgias and myalgias. Skin: Negative for rash. Neurological: Negative for dizziness and headaches. Psychiatric/Behavioral: Positive for decreased concentration. Negative for confusion and sleep disturbance. The patient is not nervous/anxious. All other systems reviewed and are negative. Objective:     Physical Exam  Vitals and nursing note reviewed. Constitutional:       General: He is not in acute distress. Appearance: Normal appearance. HENT:      Head: Normocephalic. Mouth/Throat:      Mouth: Mucous membranes are moist.   Eyes:      Extraocular Movements: Extraocular movements intact. Conjunctiva/sclera: Conjunctivae normal.      Pupils: Pupils are equal, round, and reactive to light. Cardiovascular:      Rate and Rhythm: Normal rate and regular rhythm. Pulses: Normal pulses. Heart sounds: Normal heart sounds. No murmur heard. Pulmonary:      Effort: Pulmonary effort is normal. No respiratory distress. Breath sounds: Normal breath sounds. Abdominal:      General: Abdomen is flat. Bowel sounds are normal.      Palpations: Abdomen is soft. Tenderness: There is no abdominal tenderness. Musculoskeletal:      Cervical back: Normal range of motion. No bony tenderness. Normal range of motion. Thoracic back: No bony tenderness. Normal range of motion. Lumbar back: No bony tenderness. Normal range of motion. Negative right straight leg raise test and negative left straight leg raise test.        Back:       Right lower leg: No edema. Left lower leg: No edema. Lymphadenopathy:      Cervical: No cervical adenopathy. Skin:     General: Skin is warm. Capillary Refill: Capillary refill takes less than 2 seconds. Neurological:      General: No focal deficit present.       Mental

## 2021-07-01 DIAGNOSIS — M79.10 MUSCLE PAIN: ICD-10-CM

## 2021-07-02 RX ORDER — CYCLOBENZAPRINE HCL 10 MG
10 TABLET ORAL 2 TIMES DAILY PRN
Qty: 10 TABLET | Refills: 0 | Status: SHIPPED | OUTPATIENT
Start: 2021-07-02 | End: 2021-07-07

## 2021-07-09 ENCOUNTER — OFFICE VISIT (OUTPATIENT)
Dept: PRIMARY CARE CLINIC | Age: 31
End: 2021-07-09
Payer: COMMERCIAL

## 2021-07-09 VITALS
HEIGHT: 77 IN | HEART RATE: 98 BPM | SYSTOLIC BLOOD PRESSURE: 104 MMHG | WEIGHT: 226.6 LBS | OXYGEN SATURATION: 97 % | RESPIRATION RATE: 16 BRPM | BODY MASS INDEX: 26.75 KG/M2 | DIASTOLIC BLOOD PRESSURE: 74 MMHG

## 2021-07-09 DIAGNOSIS — S30.0XXD CONTUSION OF LOWER BACK, SUBSEQUENT ENCOUNTER: ICD-10-CM

## 2021-07-09 DIAGNOSIS — F90.0 ATTENTION DEFICIT HYPERACTIVITY DISORDER (ADHD), PREDOMINANTLY INATTENTIVE TYPE: Primary | ICD-10-CM

## 2021-07-09 PROCEDURE — 99214 OFFICE O/P EST MOD 30 MIN: CPT | Performed by: PHYSICIAN ASSISTANT

## 2021-07-09 RX ORDER — CYCLOBENZAPRINE HCL 10 MG
10 TABLET ORAL PRN
COMMUNITY
End: 2021-08-27 | Stop reason: SDUPTHER

## 2021-07-09 ASSESSMENT — ENCOUNTER SYMPTOMS
NAUSEA: 0
EYE PAIN: 0
VOMITING: 0
CONSTIPATION: 0
SHORTNESS OF BREATH: 0
SINUS PAIN: 0
ABDOMINAL PAIN: 0
DIARRHEA: 0
COUGH: 0
RHINORRHEA: 0

## 2021-07-09 NOTE — PATIENT INSTRUCTIONS
Schedule a Vaccine  When you qualify to receive the vaccine, call the [facility]) COVID-19 Vaccination Hotline to schedule your appointment or to get additional information about the [facility]) locations which are offering the COVID-19 vaccine. To be 94% effective, it's important that you receive two doses of one of the COVID-19 vaccines. -If you are receiving the Chen Peter vaccine, your second shot will be scheduled as close to 21 days after the first shot as possible. -If you are receiving the Moderna vaccine, your second shot will be scheduled as close to 28 days after the first shot as possible. [facility]) COVID-19 Vaccination Hotline: [phone]    Links to [facility]) website and [facility] website:    [employer]/[facility]-monitoring-coronavirus-covid-19/covid-19-vaccine/[place]/christian-vaccine    [email or web address]

## 2021-07-09 NOTE — PROGRESS NOTES
[address] [facility] PRIMARY CARE  [address]   [address]  [address] [address]  Dept: [phone]  Dept Fax: [phone]    Chandler Owens is a 32 y.o. male who presents today for his medical conditions/complaints as noted below. Chief Complaint   Patient presents with    Medication Check    Back Pain     still having pain since last OV, would like to discuss       HPI:     Patient presents to the office for medication recheck and recheck of back pain. Patient reports Adderall is working well for ADHD. He has not noticed any side effects. He has not switched job positions, so he is waiting to see how his focus and concentration changes. He reports Adderall is helping function of day-to-day activity with staying on track and less distractibility. Patient does have continued concerns for left thoracic back injury. He reports continued discomfort in the left lower rib/thoracic area since his jujitsu injury. He has returned to most activity however notices pain upon direct  pressure. He has noticed a bulge/lump in the area of trauma. He reports some muscle relaxant is effective. Otherwise no new complaints or concerns. BP stable. Weight stable.       No results found for: LABA1C          ( goal A1C is < 7)   No results found for: LABMICR  No results found for: LDLCHOLESTEROL, LDLCALC    (goal LDL is <100)   AST (U/L)   Date Value   02/02/2020 15     ALT (U/L)   Date Value   02/02/2020 16     BUN (mg/dL)   Date Value   02/02/2020 9     BP Readings from Last 3 Encounters:   07/09/21 104/74   06/07/21 118/78   06/02/21 118/78          (goal 120/80)    Past Medical History:   Diagnosis Date    ADHD (attention deficit hyperactivity disorder)       Past Surgical History:   Procedure Laterality Date    APPENDECTOMY      HERNIA REPAIR Left     inguinal hernia repair       Family History   Problem Relation Age of Onset    Other Mother     High Blood Pressure Father        Social History     Tobacco Use    Smoking status: Never Smoker    Smokeless tobacco: Former User     Types: Chew   Substance Use Topics    Alcohol use: Yes     Comment: socially      Current Outpatient Medications   Medication Sig Dispense Refill    amphetamine-dextroamphetamine (ADDERALL XR) 10 MG extended release capsule Take 1 capsule by mouth every morning for 30 days. 30 capsule 0    cyclobenzaprine (FLEXERIL) 10 MG tablet Take 10 mg by mouth as needed for Muscle spasms       No current facility-administered medications for this visit. No Known Allergies    Health Maintenance   Topic Date Due    Hepatitis C screen  Never done    Varicella vaccine (1 of 2 - 2-dose childhood series) Never done    COVID-19 Vaccine (1) Never done    HIV screen  Never done    DTaP/Tdap/Td vaccine (1 - Tdap) Never done    Flu vaccine (1) 09/01/2021    Hepatitis A vaccine  Aged Out    Hepatitis B vaccine  Aged Out    Hib vaccine  Aged Out    Meningococcal (ACWY) vaccine  Aged Out    Pneumococcal 0-64 years Vaccine  Aged Out       Subjective:      Review of Systems   Constitutional: Negative for chills, fatigue and fever. HENT: Negative for congestion, rhinorrhea and sinus pain. Eyes: Negative for pain. Respiratory: Negative for cough and shortness of breath. Cardiovascular: Negative for chest pain and leg swelling. Gastrointestinal: Negative for abdominal pain, constipation, diarrhea, nausea and vomiting. Genitourinary: Negative for difficulty urinating, enuresis and testicular pain. Musculoskeletal: Positive for back pain. Negative for arthralgias and myalgias. Skin: Negative for rash. Neurological: Negative for dizziness and headaches. Psychiatric/Behavioral: Positive for decreased concentration. Negative for confusion and sleep disturbance. The patient is not nervous/anxious. All other systems reviewed and are negative.       Objective:     Physical Exam  Vitals and nursing note reviewed. Constitutional:       General: He is not in acute distress. Appearance: Normal appearance. He is normal weight. HENT:      Head: Normocephalic. Mouth/Throat:      Mouth: Mucous membranes are moist.   Eyes:      Extraocular Movements: Extraocular movements intact. Conjunctiva/sclera: Conjunctivae normal.      Pupils: Pupils are equal, round, and reactive to light. Cardiovascular:      Rate and Rhythm: Normal rate and regular rhythm. Pulses: Normal pulses. Heart sounds: Normal heart sounds. Pulmonary:      Effort: Pulmonary effort is normal.      Breath sounds: Normal breath sounds. Abdominal:      General: Abdomen is flat. Bowel sounds are normal.      Palpations: Abdomen is soft. Tenderness: There is no abdominal tenderness. Musculoskeletal:      Cervical back: Normal range of motion. Back:       Right lower leg: No edema. Left lower leg: No edema. Lymphadenopathy:      Cervical: No cervical adenopathy. Skin:     General: Skin is warm. Capillary Refill: Capillary refill takes less than 2 seconds. Neurological:      General: No focal deficit present. Mental Status: He is alert and oriented to person, place, and time. Psychiatric:         Mood and Affect: Mood normal.         Behavior: Behavior normal.       /74 (Site: Left Upper Arm, Position: Sitting, Cuff Size: Large Adult)   Pulse 98   Resp 16   Ht 6' 5\" (1.956 m)   Wt 226 lb 9.6 oz (102.8 kg)   SpO2 97%   BMI 26.87 kg/m²     Assessment:       ICD-10-CM    1. Attention deficit hyperactivity disorder (ADHD), predominantly inattentive type  F90.0 amphetamine-dextroamphetamine (ADDERALL XR) 10 MG extended release capsule   2. Contusion of lower back, subsequent encounter  S30. 0XXD             Plan:      1. Patient given refill for Adderall. Plan to continue current dosage.   Discussed that if he runs into difficulty with changing positions at his job and we can discuss further increasing medicine to 20 once daily. 2.  Patient with continued left thoracic back pain from jujitsu injury. Plan to order imaging however will consult radiology for study of choice. Continue muscle relaxants as needed. Continue Tylenol as needed. Return in about 3 months (around 10/9/2021) for medication recheck. No orders of the defined types were placed in this encounter. Patient given educational materials - see patient instructions. Discussed use, benefit, and side effects of prescribed medications. All patient questions answered. Pt voiced understanding. Reviewed health maintenance. Instructed to continue current medications, diet and exercise. Patient agreed with treatment plan. Follow up as directed.         Electronically signed by Andreina Melgoza PA-C on 7/12/2021 at 9:58 AM

## 2021-07-12 ENCOUNTER — TELEPHONE (OUTPATIENT)
Dept: PRIMARY CARE CLINIC | Age: 31
End: 2021-07-12

## 2021-07-12 DIAGNOSIS — M95.4 RIB DEFORMITY: ICD-10-CM

## 2021-07-12 DIAGNOSIS — S30.0XXD CONTUSION OF LOWER BACK, SUBSEQUENT ENCOUNTER: Primary | ICD-10-CM

## 2021-07-12 RX ORDER — DEXTROAMPHETAMINE SACCHARATE, AMPHETAMINE ASPARTATE MONOHYDRATE, DEXTROAMPHETAMINE SULFATE AND AMPHETAMINE SULFATE 2.5; 2.5; 2.5; 2.5 MG/1; MG/1; MG/1; MG/1
10 CAPSULE, EXTENDED RELEASE ORAL EVERY MORNING
Qty: 30 CAPSULE | Refills: 0 | Status: SHIPPED | OUTPATIENT
Start: 2021-07-12 | End: 2021-08-27 | Stop reason: SDUPTHER

## 2021-07-12 ASSESSMENT — ENCOUNTER SYMPTOMS: BACK PAIN: 1

## 2021-07-12 NOTE — TELEPHONE ENCOUNTER
----- Message from Imtiaz Gutiérrez PA-C sent at 7/12/2021  3:51 PM EDT -----  Please advise patient that I discussed case with radiologist. Plan to order CT chest with contrast for further evaluation and treatment of left thoracic back. Thank you.

## 2021-08-10 ENCOUNTER — HOSPITAL ENCOUNTER (OUTPATIENT)
Age: 31
Setting detail: SPECIMEN
Discharge: HOME | End: 2021-08-10
Payer: COMMERCIAL

## 2021-08-12 LAB — SURGICAL PATHOLOGY REPORT: NORMAL

## 2021-08-27 DIAGNOSIS — F90.0 ATTENTION DEFICIT HYPERACTIVITY DISORDER (ADHD), PREDOMINANTLY INATTENTIVE TYPE: ICD-10-CM

## 2021-08-27 RX ORDER — CYCLOBENZAPRINE HCL 10 MG
10 TABLET ORAL PRN
Qty: 30 TABLET | Refills: 0 | Status: SHIPPED | OUTPATIENT
Start: 2021-08-27 | End: 2021-09-29 | Stop reason: SDUPTHER

## 2021-08-27 RX ORDER — DEXTROAMPHETAMINE SACCHARATE, AMPHETAMINE ASPARTATE MONOHYDRATE, DEXTROAMPHETAMINE SULFATE AND AMPHETAMINE SULFATE 2.5; 2.5; 2.5; 2.5 MG/1; MG/1; MG/1; MG/1
10 CAPSULE, EXTENDED RELEASE ORAL EVERY MORNING
Qty: 30 CAPSULE | Refills: 0 | Status: SHIPPED | OUTPATIENT
Start: 2021-08-27 | End: 2021-09-29 | Stop reason: SDUPTHER

## 2021-08-27 NOTE — TELEPHONE ENCOUNTER
Pt is requesting a dosage increase for adderall form 10mg to 20mg, this was discussed in previous visit.    lov 7.9.21  Nov Visit date not found

## 2021-09-29 DIAGNOSIS — F90.0 ATTENTION DEFICIT HYPERACTIVITY DISORDER (ADHD), PREDOMINANTLY INATTENTIVE TYPE: ICD-10-CM

## 2021-09-29 RX ORDER — CYCLOBENZAPRINE HCL 10 MG
10 TABLET ORAL PRN
Qty: 30 TABLET | Refills: 0 | Status: SHIPPED | OUTPATIENT
Start: 2021-09-29 | End: 2021-12-06 | Stop reason: SDUPTHER

## 2021-09-29 RX ORDER — DEXTROAMPHETAMINE SACCHARATE, AMPHETAMINE ASPARTATE MONOHYDRATE, DEXTROAMPHETAMINE SULFATE AND AMPHETAMINE SULFATE 5; 5; 5; 5 MG/1; MG/1; MG/1; MG/1
20 CAPSULE, EXTENDED RELEASE ORAL EVERY MORNING
Qty: 30 CAPSULE | Refills: 0 | Status: SHIPPED | OUTPATIENT
Start: 2021-09-29 | End: 2021-10-29 | Stop reason: SDUPTHER

## 2021-10-29 DIAGNOSIS — F90.0 ATTENTION DEFICIT HYPERACTIVITY DISORDER (ADHD), PREDOMINANTLY INATTENTIVE TYPE: ICD-10-CM

## 2021-10-29 RX ORDER — DEXTROAMPHETAMINE SACCHARATE, AMPHETAMINE ASPARTATE MONOHYDRATE, DEXTROAMPHETAMINE SULFATE AND AMPHETAMINE SULFATE 5; 5; 5; 5 MG/1; MG/1; MG/1; MG/1
20 CAPSULE, EXTENDED RELEASE ORAL EVERY MORNING
Qty: 30 CAPSULE | Refills: 0 | Status: SHIPPED | OUTPATIENT
Start: 2021-10-29 | End: 2021-12-06 | Stop reason: SDUPTHER

## 2021-11-05 ENCOUNTER — TELEMEDICINE (OUTPATIENT)
Dept: PRIMARY CARE CLINIC | Age: 31
End: 2021-11-05
Payer: COMMERCIAL

## 2021-11-05 DIAGNOSIS — F90.0 ATTENTION DEFICIT HYPERACTIVITY DISORDER (ADHD), PREDOMINANTLY INATTENTIVE TYPE: Primary | ICD-10-CM

## 2021-11-05 PROCEDURE — 99213 OFFICE O/P EST LOW 20 MIN: CPT | Performed by: PHYSICIAN ASSISTANT

## 2021-11-05 ASSESSMENT — ENCOUNTER SYMPTOMS
RHINORRHEA: 0
BACK PAIN: 0
SHORTNESS OF BREATH: 0
EYE PAIN: 0
NAUSEA: 0
ABDOMINAL PAIN: 0
CONSTIPATION: 0
COUGH: 0
SINUS PAIN: 0
DIARRHEA: 0
VOMITING: 0

## 2021-11-05 NOTE — PROGRESS NOTES
2021    TELEHEALTH EVALUATION -- Audio/Visual (During TCWSL-62 public health emergency)    HPI:    Celestino Montes (:  1990) has requested an audio/video evaluation for the following concern(s):    Patient presents to the office for routine medication follow-up. He has past medical history including ADHD. He is currently taking Adderall extended release 20 mg once daily. He reports this is very effective for managing current symptoms of concentration and focus. Medication allows him to stay on task and properly functioning while working. He denies any side effects and is tolerating the medication well. In the interim, patient has undergone vasectomy without issue. Denies any other concerns at this time. Review of Systems   Constitutional: Negative for chills, fatigue and fever. HENT: Negative for congestion, rhinorrhea and sinus pain. Eyes: Negative for pain. Respiratory: Negative for cough and shortness of breath. Cardiovascular: Negative for chest pain and leg swelling. Gastrointestinal: Negative for abdominal pain, constipation, diarrhea, nausea and vomiting. Genitourinary: Negative for difficulty urinating, enuresis and testicular pain. Musculoskeletal: Negative for arthralgias, back pain and myalgias. Skin: Negative for rash. Neurological: Negative for dizziness and headaches. Psychiatric/Behavioral: Negative for confusion and sleep disturbance. The patient is not nervous/anxious. All other systems reviewed and are negative. Prior to Visit Medications    Medication Sig Taking? Authorizing Provider   amphetamine-dextroamphetamine (ADDERALL XR) 20 MG extended release capsule Take 1 capsule by mouth every morning for 30 days.   Gustavo Poon PA-C   cyclobenzaprine (FLEXERIL) 10 MG tablet Take 1 tablet by mouth as needed for Muscle spasms  Gustavo Poon PA-C       Social History     Tobacco Use    Smoking status: Never Smoker    Smokeless tobacco: Former [x] No significant exanthematous lesions or discoloration noted on facial skin         [] Abnormal-            Psychiatric:       [x] Normal Affect [x] No Hallucinations        [] Abnormal-     Other pertinent observable physical exam findings-     ASSESSMENT/PLAN:  1. Attention deficit hyperactivity disorder (ADHD), predominantly inattentive type  Patient currently stable on Adderall XR 20 mg once daily. I advised to continue to monitor for side effects. Return in about 3 months (around 2/5/2022) for physical.    [place], was evaluated through a synchronous (real-time) audio-video encounter. The patient (or guardian if applicable) is aware that this is a billable service. Verbal consent to proceed has been obtained within the past 12 months. The visit was conducted pursuant to the emergency declaration under the [address] [place] [address], [address] [place] [address] authority and the [email or web address] and [employer] General Act. Patient identification was verified, and a caregiver was present when appropriate. The patient was located in a state where the provider was credentialed to provide care. Total time spent on this encounter: Not billed by time    --Darin Mcdermott PA-C on 11/5/2021 at 7:40 AM    An electronic signature was used to authenticate this note.

## 2021-12-06 DIAGNOSIS — F90.0 ATTENTION DEFICIT HYPERACTIVITY DISORDER (ADHD), PREDOMINANTLY INATTENTIVE TYPE: ICD-10-CM

## 2021-12-06 RX ORDER — DEXTROAMPHETAMINE SACCHARATE, AMPHETAMINE ASPARTATE MONOHYDRATE, DEXTROAMPHETAMINE SULFATE AND AMPHETAMINE SULFATE 5; 5; 5; 5 MG/1; MG/1; MG/1; MG/1
20 CAPSULE, EXTENDED RELEASE ORAL EVERY MORNING
Qty: 30 CAPSULE | Refills: 0 | Status: SHIPPED | OUTPATIENT
Start: 2021-12-06 | End: 2022-01-01 | Stop reason: SDUPTHER

## 2021-12-06 RX ORDER — CYCLOBENZAPRINE HCL 10 MG
10 TABLET ORAL PRN
Qty: 30 TABLET | Refills: 0 | Status: SHIPPED | OUTPATIENT
Start: 2021-12-06 | End: 2022-05-05 | Stop reason: SDUPTHER

## 2022-01-01 DIAGNOSIS — F90.0 ATTENTION DEFICIT HYPERACTIVITY DISORDER (ADHD), PREDOMINANTLY INATTENTIVE TYPE: ICD-10-CM

## 2022-01-03 RX ORDER — DEXTROAMPHETAMINE SACCHARATE, AMPHETAMINE ASPARTATE MONOHYDRATE, DEXTROAMPHETAMINE SULFATE AND AMPHETAMINE SULFATE 5; 5; 5; 5 MG/1; MG/1; MG/1; MG/1
20 CAPSULE, EXTENDED RELEASE ORAL EVERY MORNING
Qty: 30 CAPSULE | Refills: 0 | Status: SHIPPED | OUTPATIENT
Start: 2022-01-03 | End: 2022-02-09 | Stop reason: SDUPTHER

## 2022-02-09 DIAGNOSIS — F90.0 ATTENTION DEFICIT HYPERACTIVITY DISORDER (ADHD), PREDOMINANTLY INATTENTIVE TYPE: ICD-10-CM

## 2022-02-09 RX ORDER — DEXTROAMPHETAMINE SACCHARATE, AMPHETAMINE ASPARTATE MONOHYDRATE, DEXTROAMPHETAMINE SULFATE AND AMPHETAMINE SULFATE 5; 5; 5; 5 MG/1; MG/1; MG/1; MG/1
20 CAPSULE, EXTENDED RELEASE ORAL EVERY MORNING
Qty: 30 CAPSULE | Refills: 0 | Status: SHIPPED | OUTPATIENT
Start: 2022-02-09 | End: 2022-03-05 | Stop reason: SDUPTHER

## 2022-03-05 DIAGNOSIS — F90.0 ATTENTION DEFICIT HYPERACTIVITY DISORDER (ADHD), PREDOMINANTLY INATTENTIVE TYPE: ICD-10-CM

## 2022-03-07 RX ORDER — DEXTROAMPHETAMINE SACCHARATE, AMPHETAMINE ASPARTATE MONOHYDRATE, DEXTROAMPHETAMINE SULFATE AND AMPHETAMINE SULFATE 5; 5; 5; 5 MG/1; MG/1; MG/1; MG/1
20 CAPSULE, EXTENDED RELEASE ORAL EVERY MORNING
Qty: 30 CAPSULE | Refills: 0 | Status: SHIPPED | OUTPATIENT
Start: 2022-03-07 | End: 2022-05-05 | Stop reason: SDUPTHER

## 2022-03-07 NOTE — TELEPHONE ENCOUNTER
Pt. Scheduled for 3/9/22. Pt. Scheduled for a VV and verbalized understanding on how to login for his appt.

## 2022-03-08 ENCOUNTER — TELEMEDICINE (OUTPATIENT)
Dept: PRIMARY CARE CLINIC | Age: 32
End: 2022-03-08
Payer: COMMERCIAL

## 2022-03-08 DIAGNOSIS — F90.0 ATTENTION DEFICIT HYPERACTIVITY DISORDER (ADHD), PREDOMINANTLY INATTENTIVE TYPE: Primary | ICD-10-CM

## 2022-03-08 PROCEDURE — 99213 OFFICE O/P EST LOW 20 MIN: CPT | Performed by: PHYSICIAN ASSISTANT

## 2022-03-08 ASSESSMENT — ENCOUNTER SYMPTOMS
VOMITING: 0
RHINORRHEA: 0
ABDOMINAL PAIN: 0
SINUS PAIN: 0
COUGH: 0
DIARRHEA: 0
CONSTIPATION: 0
EYE PAIN: 0
NAUSEA: 0
SHORTNESS OF BREATH: 0
BACK PAIN: 0

## 2022-03-08 NOTE — PROGRESS NOTES
3/8/2022    TELEHEALTH EVALUATION -- Audio/Visual (During ITYMH-61 public health emergency)    HPI:    Loida Marshall (:  1990) has requested an audio/video evaluation for the following concern(s):    Patient presents as a virtual visit for medication check. Patient has known ADHD. He is currently managed on Adderall extended release 20 mg. He reports that for the most part medication is going well without any side effects. There are instances where he has worked a long shift and medication does not provide enough relief. Medication is helping his concentration/focus. He is noticing less distractibility and easier time completing task. He also reports the medication is providing boost of energy. He does mention intermittent low back pain. The pain is concentrated to the low back. He feels it is related to him sitting while at work all day. Denies any radiating pains, numbness, tingling, saddle anesthesia. He defers work-up at this time. No other complaints or concerns      Review of Systems   Constitutional: Negative for chills, fatigue and fever. HENT: Negative for congestion, rhinorrhea and sinus pain. Eyes: Negative for pain. Respiratory: Negative for cough and shortness of breath. Cardiovascular: Negative for chest pain and leg swelling. Gastrointestinal: Negative for abdominal pain, constipation, diarrhea, nausea and vomiting. Genitourinary: Negative for difficulty urinating, enuresis and testicular pain. Musculoskeletal: Negative for arthralgias, back pain and myalgias. Skin: Negative for rash. Neurological: Negative for dizziness and headaches. Psychiatric/Behavioral: Positive for decreased concentration. Negative for confusion and sleep disturbance. The patient is not nervous/anxious. All other systems reviewed and are negative. Prior to Visit Medications    Medication Sig Taking?  Authorizing Provider   amphetamine-dextroamphetamine (ADDERALL XR) 20 MG extended release capsule Take 1 capsule by mouth every morning for 30 days. Yes Gustavo Poon PA-C   cyclobenzaprine (FLEXERIL) 10 MG tablet Take 1 tablet by mouth as needed for Muscle spasms Yes Gustavo Poon PA-C       Social History     Tobacco Use    Smoking status: Never Smoker    Smokeless tobacco: Former User     Types: Chew   Vaping Use    Vaping Use: Never used   Substance Use Topics    Alcohol use: Yes     Comment: socially    Drug use: Never        No Known Allergies,   Past Medical History:   Diagnosis Date    ADHD (attention deficit hyperactivity disorder)    ,   Past Surgical History:   Procedure Laterality Date    APPENDECTOMY      HERNIA REPAIR Left     inguinal hernia repair   ,   Social History     Tobacco Use    Smoking status: Never Smoker    Smokeless tobacco: Former User     Types: Chew   Vaping Use    Vaping Use: Never used   Substance Use Topics    Alcohol use: Yes     Comment: socially    Drug use: Never   ,   Family History   Problem Relation Age of Onset    Other Mother     High Blood Pressure Father    ,   There is no immunization history on file for this patient. PHYSICAL EXAMINATION:  [ INSTRUCTIONS:  \"[x]\" Indicates a positive item  \"[]\" Indicates a negative item  -- DELETE ALL ITEMS NOT EXAMINED]  Vital Signs: (As obtained by patient/caregiver or practitioner observation)      Constitutional: [x] Appears well-developed and well-nourished [x] No apparent distress      [] Abnormal-   Mental status  [x] Alert and awake  [x] Oriented to person/place/time [x]Able to follow commands      Eyes:  EOM    [x]  Normal  [] Abnormal-  Sclera  [x]  Normal  [] Abnormal -         Discharge [x]  None visible  [] Abnormal -    HENT:   [x] Normocephalic, atraumatic.   [] Abnormal   [x] Mouth/Throat: Mucous membranes are moist.     External Ears [x] Normal  [] Abnormal-     Neck: [x] No visualized mass     Pulmonary/Chest: [x] Respiratory effort normal.  [x] No visualized signs of difficulty breathing or respiratory distress        [] Abnormal-      Musculoskeletal:   [x] Normal range of motion of neck        [] Abnormal-       Neurological:        [x] No Facial Asymmetry (Cranial nerve 7 motor function) (limited exam to video visit)          [x] No gaze palsy        [] Abnormal-         Skin:        [x] No significant exanthematous lesions or discoloration noted on facial skin         [] Abnormal-            Psychiatric:       [x] Normal Affect [x] No Hallucinations        [] Abnormal-     Other pertinent observable physical exam findings-     ASSESSMENT/PLAN:  1. Attention deficit hyperactivity disorder (ADHD), predominantly inattentive type  ADHD is stable. Plan to continue current Adderall 20 mg extended release. I discussed medication compliance and avoiding overuse. He is agreeable to plan. Return in about 4 months (around 7/8/2022) for physical.    Lear Lv, was evaluated through a synchronous (real-time) audio-video encounter. The patient (or guardian if applicable) is aware that this is a billable service, which includes applicable co-pays. This Virtual Visit was conducted with patient's (and/or legal guardian's) consent. The visit was conducted pursuant to the emergency declaration under the [address] [place] [address], [address] [place] [address] wa[facility]als authority and the [employer] and [employer] General Act. Patient identification was verified, and a caregiver was present when appropriate. The patient was located at home in a state where the provider was licensed to provide care. Total time spent on this encounter: Not billed by time    --Maria Del Carmen Saenz PA-C on 3/8/2022 at 1:46 PM    An electronic signature was used to authenticate this note.

## 2022-04-04 ENCOUNTER — PATIENT MESSAGE (OUTPATIENT)
Dept: PRIMARY CARE CLINIC | Age: 32
End: 2022-04-04

## 2022-04-04 DIAGNOSIS — M25.519 ACUTE SHOULDER PAIN, UNSPECIFIED LATERALITY: Primary | ICD-10-CM

## 2022-04-04 NOTE — TELEPHONE ENCOUNTER
From: Carmita Hoffman  [facility]: 4/4/2022 7:03 AM EDT  Subject: Left Shoulder     I have had this naggy pain in my shoulder for a few months but its been better for a bit, now its a different pain but its by far significantly worse and hurts no matter what position my arm is in moving or not. Obviously worse when moved certain ways.  Gio if I should just get a scan and see a specialist or whatever you think, just cant take the pain anymore

## 2022-04-11 ENCOUNTER — OFFICE VISIT (OUTPATIENT)
Dept: ORTHOPEDIC SURGERY | Age: 32
End: 2022-04-11
Payer: COMMERCIAL

## 2022-04-11 VITALS — WEIGHT: 250 LBS | BODY MASS INDEX: 29.52 KG/M2 | HEIGHT: 77 IN

## 2022-04-11 DIAGNOSIS — M25.512 LEFT SHOULDER PAIN, UNSPECIFIED CHRONICITY: ICD-10-CM

## 2022-04-11 DIAGNOSIS — M75.22 BICEPS TENDINITIS OF LEFT SHOULDER: Primary | ICD-10-CM

## 2022-04-11 PROCEDURE — 99203 OFFICE O/P NEW LOW 30 MIN: CPT | Performed by: ORTHOPAEDIC SURGERY

## 2022-04-11 NOTE — LETTER
4/11/2022    Gustavo Poon, [address] [facility]. [address]. Afia Wilson [address] [facility],  [facility] [address]    RE: Robert Elise    Dear Dr. Armaan Zamarripa,    Thank you for allowing me to participate in the care of Mr. Joseline Guidry. I had the opportunity to evaluate the patient on 4/11/2022. Attached you will find my evaluation and recommendations. Thanks again for the confidence you have expressed in me by allowing my participation in the care of your patient. I will keep you apprised of further developments in the patients treatment course as it progresses. If I can be of further assistance in any fashion, please feel free to contact me at your convenience.     Sincerely,         Ping Tirado  Shoulder and Elbow Surgery

## 2022-04-15 ENCOUNTER — OFFICE VISIT (OUTPATIENT)
Dept: ORTHOPEDIC SURGERY | Age: 32
End: 2022-04-15
Payer: COMMERCIAL

## 2022-04-15 DIAGNOSIS — M75.22 BICEPS TENDINITIS OF LEFT SHOULDER: Primary | ICD-10-CM

## 2022-04-15 PROCEDURE — 99203 OFFICE O/P NEW LOW 30 MIN: CPT | Performed by: FAMILY MEDICINE

## 2022-04-15 PROCEDURE — 20550 NJX 1 TENDON SHEATH/LIGAMENT: CPT | Performed by: FAMILY MEDICINE

## 2022-04-15 RX ORDER — BUPIVACAINE HYDROCHLORIDE 5 MG/ML
1 INJECTION, SOLUTION PERINEURAL ONCE
Status: COMPLETED | OUTPATIENT
Start: 2022-04-15 | End: 2022-04-15

## 2022-04-15 RX ORDER — BETAMETHASONE SODIUM PHOSPHATE AND BETAMETHASONE ACETATE 3; 3 MG/ML; MG/ML
6 INJECTION, SUSPENSION INTRA-ARTICULAR; INTRALESIONAL; INTRAMUSCULAR; SOFT TISSUE ONCE
Status: COMPLETED | OUTPATIENT
Start: 2022-04-15 | End: 2022-04-15

## 2022-04-15 RX ADMIN — BETAMETHASONE SODIUM PHOSPHATE AND BETAMETHASONE ACETATE 6 MG: 3; 3 INJECTION, SUSPENSION INTRA-ARTICULAR; INTRALESIONAL; INTRAMUSCULAR; SOFT TISSUE at 08:45

## 2022-04-15 RX ADMIN — BUPIVACAINE HYDROCHLORIDE 5 MG: 5 INJECTION, SOLUTION PERINEURAL at 08:45

## 2022-04-15 NOTE — PROGRESS NOTES
Sports Medicine Consultation    CHIEF COMPLAINT:  Shoulder Pain (Left. 7m. pain after lifting a friend above his head. pain worse over last 2wks. )        HPI:   The patient is a 28 y.o. male who is being seen as a new patient being seen for regarding new problem left. The patient is a right hand dominant male who has had shoulder pain for 7 months. As far as trauma to the shoulder, the patient indicates sharp pain after lifting someone overhead. The pain is  worse at night and when doing overhead activities. Weakness of the shoulder has  been noted. The pain restricts activities such as working out. The pain does not seem to improve with time. The following medications and interventions have been tried: nsaids without benefit. Physical Therapy has not been tried. Corticosteroid injection has not been done. Neck pain has been present. Numbness and/or tingling has been present. he has a past medical history of ADHD (attention deficit hyperactivity disorder). he has a past surgical history that includes Appendectomy and hernia repair (Left). Past Medical History:   Diagnosis Date    ADHD (attention deficit hyperactivity disorder)        Past Surgical History:   Procedure Laterality Date    APPENDECTOMY      HERNIA REPAIR Left     inguinal hernia repair       family history includes High Blood Pressure in his father; Other in his mother.     Social History     Socioeconomic History    Marital status: Single     Spouse name: Not on file    Number of children: Not on file    Years of education: Not on file    Highest education level: Not on file   Occupational History    Not on file   Tobacco Use    Smoking status: Never Smoker    Smokeless tobacco: Former User     Types: Chew   Vaping Use    Vaping Use: Never used   Substance and Sexual Activity    Alcohol use: Yes     Comment: socially    Drug use: Never    Sexual activity: Never   Other Topics Concern    Not on file   Social History Narrative    Not on file     Social Determinants of Health     Financial Resource Strain: Low Risk     Difficulty of Paying Living Expenses: Not hard at all   Food Insecurity: No Food Insecurity    Worried About Running Out of Food in the Last Year: Never true    [address] in the Last Year: Never true   Transportation Needs: No Transportation Needs    Lack of Transportation (Medical): No    Lack of Transportation (Non-Medical): No   Physical Activity:     Days of Exercise per Week: Not on file    Minutes of Exercise per Session: Not on file   Stress:     Feeling of Stress : Not on file   Social Connections:     Frequency of Communication with Friends and Family: Not on file    Frequency of Social Gatherings with Friends and Family: Not on file    Attends Je[employer] witness Services: Not on file    Active Member of [address] [place] [address] [employer] or Organizations: Not on file    Attends Club or Organization Meetings: Not on file    Marital Status: Not on file   Intimate Partner Violence:     Fear of Current or Ex-Partner: Not on file    Emotionally Abused: Not on file    Physically Abused: Not on file    Sexually Abused: Not on file   Housing Stability:     Unable to Pay for Housing in the Last Year: Not on file    Number of [place] in the Last Year: Not on file    Unstable Housing in the Last Year: Not on file       Current Outpatient Medications   Medication Sig Dispense Refill    amphetamine-dextroamphetamine (ADDERALL XR) 20 MG extended release capsule Take 1 capsule by mouth every morning for 30 days.  30 capsule 0    cyclobenzaprine (FLEXERIL) 10 MG tablet Take 1 tablet by mouth as needed for Muscle spasms 30 tablet 0     Current Facility-Administered Medications   Medication Dose Route Frequency Provider Last Rate Last Admin    bupivacaine (MARCAINE) 0.5 % injection 5 mg  1 mL Intratendinous Once Nazario Blackman DO        betamethasone acetate-betamethasone sodium phosphate (CELESTONE) injection 6 mg 6 mg Other Once Salvadore Miri, DO             Allergies:  hehas No Known Allergies. ROS:  CV:  Denies chest pain; palpitations; shortness of breath; swelling of feet, ankles; and loss of consciousness. CON: Denies fever and dizziness. ENT:  Denies hearing loss / ringing, ear infections hoarseness, and swallowing problems. RESP:  Denies chronic cough, spitting up blood, and asthma/wheezing. GI: Denies abdominal pain, change in bowel habits, nausea or vomiting, and blood in stools. :  Denies frequent urination, burning or painful urination, blood in the urine, and bladder incontinence. NEURO:  Denies headache, memory loss, sleep disturbance, and tremor or movement disorder. PHYSICAL EXAM:   There were no vitals taken for this visit. GENERAL: Davina Pinzon is a 28 y.o. male who is alert and oriented and sitting comfortably in our office. SKIN:  Intact without rashes, lesions or ulcerations. No obvious deformity or swelling. NEURO: Musculoskeletal and axillary nerves intact to sensory and motor testing. EYES:  Extraocular muscles intact. MOUTH: Oral mucosa moist.  No perioral lesions. PULM:  Respirations unlabored and regular. VASC:  Capillary refill less than 3 seconds. Cervical spine ROM WNL  Spurlings: negative,     MSK:  Forward elevation 180 degrees, external rotation in neutral 90 degrees, abduction 180 degrees, internal rotation to T8. Supraspinatus 5/5   External rotators 5/5  Internal 5/5  Full Can negative   Empty Can negative   Neer's test negative   Daugherty-Evaristo test. negative. Pain with cross body adduction negative. Anterior Labral Stress test negative. Speed's test positive   Page's test negative. Pain over anterolateral acromion negative. Pain over AC joint negative. Pain over traps/rhomboids positive. PSYCH:  Patient has good fund of knowledge and displays understanding of exam.    RADIOLOGY: No results found. IMPRESSION:     1.  Biceps tendinitis of left shoulder        PLAN:   We discussed some of the etiologies and natural histories of     ICD-10-CM    1. Biceps tendinitis of left shoulder  M75.22 bupivacaine (MARCAINE) 0.5 % injection 5 mg     betamethasone acetate-betamethasone sodium phosphate (CELESTONE) injection 6 mg     42368 - GA INJECT TENDON SHEATH/LIGAMENT     We discussed the various treatment alternatives including anti-inflammatory medications, physical therapy, injections, further imaging studies and as a last resort surgery. At this point patient has fairly clear signs of biceps tenosynovitis I do think that an injection is reasonable for the biceps tendon sheath and was administered in the manner is taken chart. Patient tolerated procedure well. He will follow-up with me as needed follow-up with Dr. Kim Comes as scheduled he will complete a course of physical therapy as previously prescribed    Return to clinic No follow-ups on file. Jaida Gray Please be aware portions of this note were completed using voice recognition software and unforeseen errors may have occurred    Electronically signed by Marva Sim DO, FAOASM on 4/15/22 at 8:12 AM EDT    After the risks, benefits, alternatives and procedure of a  left bicep tendon sheath injection were discussed. Consent was obtained and a time out was performed. Structures of the biceps tendon sheath were visualized under ultrasound with image capture to be uploaded into the electronic medical record and notation of vascular structures to be avoided. Area was prepped in a sterile manner with Betadine. The skin was then cooled with cold spray and re-cleaned with alcohol prep. Then under ultrasound guidance I injected 1 mL of 6 mg of betamethasone and 1 mL of 0.5% Marcaine into the biceps tendon sheath with image capture of the injection to be uploaded into the electronic medical record. Patient tolerated the procedure well.

## 2022-04-20 ENCOUNTER — HOSPITAL ENCOUNTER (OUTPATIENT)
Dept: PHYSICAL THERAPY | Facility: CLINIC | Age: 32
Setting detail: THERAPIES SERIES
Discharge: HOME | End: 2022-04-20
Payer: COMMERCIAL

## 2022-04-20 PROCEDURE — 97161 PT EVAL LOW COMPLEX 20 MIN: CPT

## 2022-04-20 NOTE — CONSULTS
[address] [facility]  [address] [place] [address]  Phone: [phone]  Fax: [phone]         Physical Therapy Upper Extremity Evaluation    Date:  2022  Patient: Rob Denton  : 1990  MRN: [id number]  Physician: Elsa Castillo MD  Insurance: Grace Aleman (60/60 Visits Approved)  Medical Diagnosis: M75.22- Biceps tendinitis of left shoulder    Rehab Codes: M25.512, M25.612  Onset Date: 22    Next 's appt. : --    Subjective:   CC/HPI: Pt reports to PT with L Shoulder pain. Per pt, he reports that his pain started back in 2021 in which he picked someone up over head and had pain initially after. Pt states that the pain got better after resting, however states that it began to get worse leading him to schedule a visit with physician. Pt states that he has difficulty sleeping d/t pain. Pt states that he has resumed working out, but notes that shoulder day did have some pain. Pt noting that he had an injection on 4/15/22, stating that he is unsure if it has helped yet. Pt states that he ws occasionally getting numbness down th L arm, however has not had this over the last few weeks.     PMHx:     [] Unremarkable             [x] Refer to full medical chart  In EPIC     Tests: [] X-Ray:   [] MRI:   [] Other:      Comorbidities:   [] Obesity [] Dialysis  [x] N/A   [] Asthma/COPD [] Dementia [] Other:   [] Stroke [] Sleep apnea [] Other:   [] Vascular disease [] Rheumatic disease [] Other:     ADL/IADL [x] Previously independent with all [x] Currently independent with all Who currently assists the patient with task     [] Previously independent with all except: [] Currently independent with all except:     Bathing  [] Assist [] Assist     Dress/grooming [] Assist [] Assist     Transfer/mobility [] Assist [] Assist     Feeding [] Assist [] Assist     Toileting [] Assist [] Assist     Driving [] Assist [] Assist     Housekeeping [] Assist [] Assist Grocery shop/meal prep [] Assist [] Assist         Gait Prior level of function Current level of function    [x] Independent  [] Assist [x] Independent  [] Assist   Device: [x] Independent [x] Independent    [] Straight Cane [] Quad cane [] Straight Cane [] Quad cane    [] Standard walker [] Rolling walker   [] 4 wheeled walker [] Standard walker [] Rolling walker   [] 4 wheeled walker    [] Wheelchair [] Wheelchair       Medications: [x] Refer to full medical record [] None [] Other:  Allergies:      [] Refer to full medical record [x] None [] Other:    Function:  Hand Dominance  [x] Right  [] Left  Marital Status    Home type    Stairs from outside    [facility] inside    [facility]   Job status Currently working   Work Activities/duties  Sitting, walking   Recreational Activities Working out       Pain present?  Yes   Location L Shoulder   Pain Rating currently 4/10   Pain at worse 8/10   Pain at best 0/10   Description of pain Intermittent, throbbing   Altered Sensation Denies   What makes it worse When I wake up, some exercises make it worse   What makes it better N/A   Symptom progression Gotten better following injection   Sleep Sleeping okay now              Objective:      STRENGTH    Left Right   C5 Shld Abd 5/5 5/5   Shld Flexion 4/5 5/5   Shld IR 5/5 4+/5   Shld ER 4/5 4+/5   Shld HAB     Shld Ext     C6 Elb Flex 5/5 5/5   C7 Elb Ext 5/5 5/5   C8 EPL     T1 Fing Abd                    Prone:     Retraction     Depression     IR     Abd     Scaption     Flexion                    AROM    Left Right   Shld Abd 165 174   Shld Flex 155 160   Shld IR T4 T4   Shld ER T9, pain T8   Shld HAB               Elbow Flex     Elbow Ext     Supination     Pronation          Wrist flex      Wrist ext     Wrist UD     Wrist RD                    ROM   Cervical    Flexion WNL   Extension WNL   Rotation L- WNL R- WNL    Sidebend L- WNL R- WNL    Retraction            TESTS (+/-) LEFT RIGHT Not Tested   Vertebral A   [x]   CRLF    x   Speeds  - for pain []   Neers   [x]   Daugherty  - []   Empty Can   [x]   Drop Arm  - []   Post Apprehension   [x]   Ant Apprehension    [x]   Clunk   [x]   Sulcus   [x]   Elbow varus/valgus   [x]      []      []      []       OBSERVATION No Deficit Deficit Not Tested Comments   Posture       Forward Head [] [x] []    Rounded Shoulders [] [x] []    Kyphosis [] [] []    Lordosis [] [] []    Lateral Shift [] [] []    Scoliosis [] [] []    Iliac Crest [] [] []    PSIS [] [] []    ASIS [] [] []    Genu Valgus [] [] []    Genu Varus [] [] []    Genu Recurvatum [] [] []    Pronation [] [] []    Supination [] [] []    Leg Length Discrp [] [] []    Slumped Sitting [] [x] []    Palpation [] [x] [] Reports tenderness along proximal L biceps tendon   Sensation [x] [] [] No deficits with testing   Edema [] [] [x]    Neurological [] [] [x]        Flexibility Normal LUE Deficit RUE Deficit   UTrap [] [x] [x]   L.Scap [] [x] [x]   Scalenes  [] [] []   Pec Major [] [x] [x]   Pec Minor [] [x] [x]   Lats [] [x] [x]   Supinators [] [] []   Pronators [] [] []   Other: Biceps  x                          FUNCTION Normal Difficult Unable   Sitting [x] [] []   Standing [x] [] []   Ambulation [x] [] []   Groom/Dress [x] [] []   Lift/Carry [] [x] []   Stairs [x] [] []   Bending [x] [] []   OH reach [] [x] []   Sit to Stand [x] [] []       Functional Test: UEFS Score: 18% functionally impaired         Assessment:    Patient would benefit from skilled physical therapy services in order to: Improve verónica UE flexibility, improve L UE ROM, improve L UE strength, reduce pain, and improve tolerance to activity to allow pt to return to working out recreationally without issue    Problems:    [x] ? Pain:  [x] ? ROM:  [x] ? Strength:  [x] ?  Function:  [] Other:          Goals  MET NOT MET ON-  GOING  Details   Date Addressed: NA       STG: To be met in 8 treatments           1. ? Pain: Decrease pain levels to 5/10 with ADLs/working out to help improve tolerance to working out recreationally. []  []  []      2. ? ROM: Increase flexibility in L UE to help improve posture and shoulder mechanics with activity. []  []  []      3. Pt will demonstrate L shoulder ROM equal to R in order improve symmetry and reduce need for compensations with activity. []  []  []     4. Independent with Home Exercise Programs []  []  []      []  []  []     Date Addressed: NA       LTG: To be met in 16 treatments       1. Improve score on assessment tool UEFS from 18% impairment to less than 10% impairment, demonstrating improved tolerance to activity. []  []  []     2. Reduce pain levels to 1/10 or less with ADLs/working out recreationally. []  []  []     3. ? Strength: Increase L UE strength to 5/5 grossly to help reduce need for compensations with working out recreationally. []  []  []                  Patient goals: \"Have no pain and be able to work out normal    Rehab Potential:  [x] Good  [] Fair  [] Poor   Suggested Professional Referral:  [x] No  [] Yes:  Barriers to Goal Achievement[de-identified]  [x] No  [] Yes:  Domestic Concerns:  [x] No  [] Yes:    Pt. Education:  [x] Plans/Goals, Risks/Benefits discussed  [x] Home exercise program  Method of Education: [x] Verbal  [x] Demo  [x] Written  Access Code: [email or web address]  URL: [email or web address]  Date: 04/20/2022  Prepared by: Palma Armando    Exercises  Standing Bicep Stretch at Velora Sark - 3 x daily - 7 x weekly - 3 sets - 30 seconds hold  Doorway Pec Stretch at 90 Degrees Abduction - 3 x daily - 7 x weekly - 3 sets - 30 seconds hold    Comprehension of Education:  [x] Verbalizes understanding. [x] Demonstrates understanding. [x] Needs Review. [] Demonstrates/verbalizes understanding of HEP/Ed previously given.     Treatment Plan:  [x] Therapeutic Exercise    [] Modalities:  [x] Therapeutic Activity    [] Ultrasound  [] Electrical Stimulation  [] Gait Training     [] Lumbar/Cervical Traction  [] Neuromuscular Re-education [x] Cold/hotpack [] Iontophoresis: 4 mg/mL  [x] Instruction in HEP              Dexamethasone Sodium Phosphate 40-80 mAmin  [x] Manual Therapy             []  Aquatic Therapy       [x] Vasocompression: Gearldine Jungling  [] Other:    []  Medication allergies reviewed for use of    Dexamethasone Sodium Phosphate 4mg/ml     with iontophoresis treatments. Pt is not allergic. Frequency:  2 x/week for 16 visits      Todays Treatment:  Precautions: General     Exercises: Work on left shoulder Biceps/ RTC/Scapular stabilizer strengthening  Exercise  L Shoulder/Biceps Reps/ Time Weight/ Level Comments   UBE            UT S      Levator S      Doorway pec S 3x30\"     Doorway rhomboid S      Doorway lat S      Biceps bar S 3x30\"                 3 way curls            T-band: Other:      Specific Instructions for next treatment: Continue tx per POC    Evaluation Complexity:  History (Personal factors, comorbidities) [] 0 [x] 1-2 [] 3+   Exam (limitations, restrictions) [x] 1-2 [] 3 [] 4+   Clinical presentation (progression) [x] Stable [] Evolving  [] Unstable   Decision Making [x] Low [] Moderate [] High    [x] Low Complexity [] Moderate Complexity [] High Complexity       Treatment Charges: Mins Units   [x] Evaluation       [x]  Low       []  Moderate       []  High 35 1   []  Modalities     [x]  Ther Exercise 6 0   []  Manual Therapy     []  Ther Activities     []  Aquatics     []  Vasocompression     []  Other       TOTAL TREATMENT TIME: 41    Time in: 0710       Time out: 6563    Electronically signed by: Pelon Galvez PT        Physician Signature:________________________________Date:__________________  By signing above or cosigning this note, I have reviewed this plan of care and certify a need for medically necessary rehabilitation services.      *PLEASE SIGN ABOVE AND FAX BACK ALL PAGES*

## 2022-04-25 ENCOUNTER — HOSPITAL ENCOUNTER (OUTPATIENT)
Dept: PHYSICAL THERAPY | Facility: CLINIC | Age: 32
Setting detail: THERAPIES SERIES
Discharge: HOME | End: 2022-04-25
Payer: COMMERCIAL

## 2022-04-25 PROCEDURE — 97110 THERAPEUTIC EXERCISES: CPT

## 2022-04-25 PROCEDURE — 97140 MANUAL THERAPY 1/> REGIONS: CPT

## 2022-04-25 NOTE — FLOWSHEET NOTE
[] [address]  [address]  [phone]  F: [phone] [] [address]  [facility] [address]   [address]  P: [phone]  F: [phone] [x] [address]  [address]  P: [phone]  F: [phone] [] [address]  P: [phone]  F: [phone] [] [address]  [facility]   [address]   [place]: [phone]  F: [phone]      Physical Therapy Daily Treatment Note    Date:  2022  Patient Name:  Cresencio Lake    :  1990  MRN: [id number]  Physician: Jimbo Thomson MD                   Insurance: [employer] ( Visits Approved)  Medical Diagnosis: M75.22- Biceps tendinitis of left shoulder                      Rehab Codes: M25.512, M25.612  Onset Date: 22                                       Next 's appt. : --  Visit# / total visits:      Cancels/No Shows: 0/0    Subjective: Pt arrives this morning with c/o continued pain symptoms of L shoulder. States no pain currently but while leaning on the counter bothered it some. L shoulder was a little more irritated following his previous visit. Does not give a number for the pain this morning. Pain:  [x] Yes  [] No Location: L shoulder/bicep  Pain Rating: (0-10 scale) -/10  Pain altered Tx:  [x] No  [] Yes  Action:  Comments:    Objective:       Todays Treatment:  Precautions: General                            Exercises: Work on left shoulder Biceps/ RTC/Scapular stabilizer strengthening  Exercise  L Shoulder/Biceps Reps/ Time Weight/ Level Comments   UBE/ Airdyne   6'     UBE in use              UT S 3x30\"       Levator S 3x30\"        Doorway pec S 3x30\"       Doorway rhomboid S         Doorway lat S         Biceps bar S 3x30\"                 Prone scap retraction 20x5\"     Prone scap depress 20x5\"               3 way curls                   T-band:         Rows 20x blue    Ext 20x blue    ER 20x lime                Other:  Manual: hypervolt posterior L shoulder grossly - 10'        Specific Instructions for next treatment: Continue tx per POC        Treatment Charges: Mins Units   []  Modalities     [x]  Ther Exercise 33 2   [x]  Manual Therapy 10 1   []  Ther Activities     []  Aquatics     []  Vasocompression     []  Other     Total Treatment time 43 3       Assessment: [x] Progressing toward goals. Began treatment on airdyne followed by instruction and completion of cervical and shoulder stretches to address UE ROM. Good follow through of instructions with all stretches but did note a \"pinch\" throughout the posterior shoulder with completing the door way pec stretch. Strengthening therex began this date with focus on scapular stabilization strength to improve mechanics. Denied pain with progression through treatment just mild soreness. Ended with hypervolt to address pain symptoms of posterior L shoulder experienced during stretching. Will progress pt as hi. [] No change. [] Other:  [x] Patient would continue to benefit from skilled physical therapy services in order to: Improve verónica UE flexibility, improve L UE ROM, improve L UE strength, reduce pain, and improve tolerance to activity to allow pt to return to working out recreationally without issue        Goals  MET NOT MET ON-  GOING  Details   Date Addressed: NA           STG: To be met in 8 treatments  ?          1. ? Pain: Decrease pain levels to 5/10 with ADLs/working out to help improve tolerance to working out recreationally. []??  []??  []??      2. ? ROM: Increase flexibility in L UE to help improve posture and shoulder mechanics with activity. []??  []??  []??      3.  Pt will demonstrate L shoulder ROM equal to R in order improve symmetry and reduce need for compensations with activity. []??  []??  []??      4. Independent with Home Exercise Programs []? ?  []??  []??        []? ?  []??  []??      Date Addressed: NA           LTG: To be met in 16 treatments           1. Improve score on assessment tool UEFS from 18% impairment to less than 10% impairment, demonstrating improved tolerance to activity. []??  []??  []??      2. Reduce pain levels to 1/10 or less with ADLs/working out recreationally. []??  []??  []??      3. ? Strength: Increase L UE strength to 5/5 grossly to help reduce need for compensations with working out recreationally. []??  []??  []??                           Patient goals: \"Have no pain and be able to work out normal        Pt. Education:  [x] Yes  [] No  [x] Reviewed Prior HEP/Ed  Method of Education: [x] Verbal  [x] Demo  [] Written  Comprehension of Education:  [x] Verbalizes understanding. [x] Demonstrates understanding. [] Needs review. [] Demonstrates/verbalizes HEP/Ed previously given. Plan: [x] Continue current frequency toward long and short term goals.     [x] Specific Instructions for subsequent treatments: cont per POC      Time In: 7:00 am            Time Out: 7:48 am    Electronically signed by:  Otila Kocher, PTA

## 2022-04-27 ENCOUNTER — HOSPITAL ENCOUNTER (OUTPATIENT)
Dept: PHYSICAL THERAPY | Facility: CLINIC | Age: 32
Setting detail: THERAPIES SERIES
Discharge: HOME | End: 2022-04-27
Payer: COMMERCIAL

## 2022-04-27 PROCEDURE — 97110 THERAPEUTIC EXERCISES: CPT

## 2022-04-27 NOTE — FLOWSHEET NOTE
[address]  [address]  [phone]  F: [phone] [] [address]  [facility] [address]   [address]  P: [phone]  F: [phone] [x] [address]  [address]  P: [phone]  F: [phone] [] [address]  P: [phone]  F: [phone] [] [address]  [facility]   [address]   [place]: [phone]  F: [phone]      Physical Therapy Daily Treatment Note    Date:  2022  Patient Name:  Cecilia Stinson    :  1990  MRN: [id number]  Physician: Brittany Castillo MD                   Insurance: [facility] (60/60 Visits Approved)  Medical Diagnosis: M75.22- Biceps tendinitis of left shoulder                      Rehab Codes: M25.512, M25.612  Onset Date: 22                                       Next 's appt. : --  Visit# / total visits: 3/16     Cancels/No Shows: 0/0    Subjective: Pt reports to PT with no pain this morning, only soreness, however notes that he was in some pain following last treatment which led to difficulty sleeping the last few nights. Pain:  [] Yes  [x] No  Location: L shoulder/bicep   Pain Rating: (0-10 scale) -/10  Pain altered Tx:  [x] No  [] Yes  Action:  Comments:    Objective:       Todays Treatment:  Precautions: General                            Exercises: Work on left shoulder Biceps/ RTC/Scapular stabilizer strengthening  Exercise  L Shoulder/Biceps Reps/ Time Weight/ Level Comments    UBE/ Airdyne   6'     UBE in use  x              UT S 3x30\"     x   Levator S 3x30\"      x   Doorway pec S 3x30\"     x   Doorway rhomboid S  3x30\"     x   Doorway lat S  3x30\"    Both arms bilaterally x   Biceps bar S 3x30\"     x              Prone scap retraction 20x5\"   x Prone scap depress 20x5\"      Prone W's x12, 5\"   x              3 way curls         Biceps eccentric curls 3x10   x                     T-band:          Rows 20x blue     Ext 20x blue     ER 20x lime                   Other: Hypervolt L biceps, pec        Specific Instructions for next treatment: Continue tx per POC        Treatment Charges: Mins Units   []  Modalities     [x]  Ther Exercise 39 3   [x]  Manual Therapy 6 0   []  Ther Activities     []  Aquatics     []  Vasocompression     []  Other     Total Treatment time 45 3       Assessment: [x] Progressing toward goals. Continued tx per log above with good tolerance. Continued with warm up on UBE to increase blood flow and to help prepare for following stretches and strengthening exercises. Followed up with stretches per log with goo recall on previously performed stretches. Added lat bar S today and had pt complete bilaterally d/t feeling like his shoulder was unstable when completing with single arm, better tolerance with changes. Ended with prone exercises to keep working on scap stabilizer strength with fair tolerance. Cueing required with retraction to focus on mid back and not lifting chest with good technique. Pt reported some increased pain in posterior shoulder with prone W's however was able to complete all reps with fatigue noted. [] No change. [] Other:  [x] Patient would continue to benefit from skilled physical therapy services in order to: Improve verónica UE flexibility, improve L UE ROM, improve L UE strength, reduce pain, and improve tolerance to activity to allow pt to return to working out recreationally without issue        Goals  MET NOT MET ON-  GOING  Details   Date Addressed: NA           STG: To be met in 8 treatments  ?          1. ? Pain: Decrease pain levels to 5/10 with ADLs/working out to help improve tolerance to working out recreationally.  []??  []??  []??      2. ? ROM: Increase flexibility in L UE to help improve posture and shoulder mechanics with activity. []??  []??  []??      3. Pt will demonstrate L shoulder ROM equal to R in order improve symmetry and reduce need for compensations with activity. []??  []??  []??      4. Independent with Home Exercise Programs []? ?  []??  []??        []? ?  []??  []??      Date Addressed: NA           LTG: To be met in 16 treatments           1. Improve score on assessment tool UEFS from 18% impairment to less than 10% impairment, demonstrating improved tolerance to activity. []??  []??  []??      2. Reduce pain levels to 1/10 or less with ADLs/working out recreationally. []??  []??  []??      3. ? Strength: Increase L UE strength to 5/5 grossly to help reduce need for compensations with working out recreationally. []??  []??  []??                           Patient goals: \"Have no pain and be able to work out normal        Pt. Education:  [x] Yes  [] No  [x] Reviewed Prior HEP/Ed  Method of Education: [x] Verbal  [x] Demo  [] Written  Comprehension of Education:  [x] Verbalizes understanding. [x] Demonstrates understanding. [x] Needs review. [] Demonstrates/verbalizes HEP/Ed previously given. Plan: [x] Continue current frequency toward long and short term goals. [x] Specific Instructions for subsequent treatments: cont per POC      Time In: 0700           Time Out: 3646    Electronically signed by:   Dayton Mendoza PT

## 2022-05-02 ENCOUNTER — HOSPITAL ENCOUNTER (OUTPATIENT)
Dept: PHYSICAL THERAPY | Facility: CLINIC | Age: 32
Setting detail: THERAPIES SERIES
Discharge: HOME | End: 2022-05-02
Payer: COMMERCIAL

## 2022-05-02 PROCEDURE — 97110 THERAPEUTIC EXERCISES: CPT

## 2022-05-02 NOTE — FLOWSHEET NOTE
[] [address]  [address]  [phone]  F: [phone] [] [address]  [facility] 36   [address]  P: [phone]  F: [phone] [x] [place] [facility]  [address]  P: [phone]  F: [phone] [] [address]  P: [phone]  F: [phone] [] [address]  [facility]   [address]   [place]: [phone]  F: [phone]      Physical Therapy Daily Treatment Note    Date:  2022  Patient Name:  Raymon Ramos    :  1990  MRN: [id number]  Physician: Nelson Cristobal MD                   Insurance: [employer]la [employer] ( Visits Approved)  Medical Diagnosis: M75.22- Biceps tendinitis of left shoulder                      Rehab Codes: M25.512, M25.612  Onset Date: 22                                       Next 's appt. : --  Visit# / total visits: 3/16     Cancels/No Shows: 0/0    Subjective: Pt arrives with no pain, however states that his shoulder has still been feeling \"about the same\" as it has been. Pt states that he has not been doing his exercises consistently. Pain:  [] Yes  [x] No  Location: L shoulder/bicep   Pain Rating: (0-10 scale) -/10  Pain altered Tx:  [x] No  [] Yes  Action:  Comments:    Objective:       Todays Treatment:  Precautions: General                            Exercises: Work on left shoulder Biceps/ RTC/Scapular stabilizer strengthening  Exercise  L Shoulder/Biceps Reps/ Time Weight/ Level Comments    UBE/ Airdyne   6'     UBE in use  x              UT S 3x30\"     x   Levator S 3x30\"      x   Doorway pec S 3x30\"    90*, 120* x   Doorway rhomboid S  3x30\"        Doorway lat S  3x30\"    Both arms bilaterally x   Biceps bar S 3x30\"                   Prone scap retraction 20x5\"      Prone scap depress 20x5\"      Prone W's 2x10, 5\"   x              3 way curls         Biceps eccentric curls 3x10 10#  x                     T-band:          Rows 2x10, 5\" blue  x   Ext 2x10, 5\" blue  x   ER 2x10 Blue  x          Banded wall walks 2x10 Orange  x   Other: Hypervolt L biceps, pec- Held today        Specific Instructions for next treatment: Continue tx per POC, perform manual along L clavicle musculature        Treatment Charges: Mins Units   []  Modalities     [x]  Ther Exercise 48 3   []  Manual Therapy     []  Ther Activities     []  Aquatics     []  Vasocompression     []  Other     Total Treatment time 48 3       Assessment: [x] Progressing toward goals. Completed tx per log with no issues. Pt reported some discomfort with levator S this morning, so had pt alter his technique slightly with less discomfort reported and better tolerance. Added pec S at 120* in addition to 90* with no complaints. Also progressed scapular stabilizer strength with increased reps with prone W's and addition of banded wall walks. Following completion of each set of banded wall walks, pt notes that he has some pain along L clavicle when lowering his arm, that did improve after lowered. No pain reported following session. [] No change. [] Other:  [x] Patient would continue to benefit from skilled physical therapy services in order to: Improve verónica UE flexibility, improve L UE ROM, improve L UE strength, reduce pain, and improve tolerance to activity to allow pt to return to working out recreationally without issue        Goals  MET NOT MET ON-  GOING  Details   Date Addressed: NA           STG: To be met in 8 treatments  ?          1. ? Pain: Decrease pain levels to 5/10 with ADLs/working out to help improve tolerance to working out recreationally. []??  []??  []??      2. ? ROM: Increase flexibility in L UE to help improve posture and shoulder mechanics with activity. []??  []??  []??      3.  Pt will demonstrate L shoulder ROM equal to R in order improve symmetry and reduce need for compensations with activity. []??  []??  []??      4. Independent with Home Exercise Programs []? ?  []??  []??        []? ?  []??  []??      Date Addressed: NA           LTG: To be met in 16 treatments           1. Improve score on assessment tool UEFS from 18% impairment to less than 10% impairment, demonstrating improved tolerance to activity. []??  []??  []??      2. Reduce pain levels to 1/10 or less with ADLs/working out recreationally. []??  []??  []??      3. ? Strength: Increase L UE strength to 5/5 grossly to help reduce need for compensations with working out recreationally. []??  []??  []??                           Patient goals: \"Have no pain and be able to work out normal        Pt. Education:  [x] Yes  [] No  [x] Reviewed Prior HEP/Ed  Method of Education: [x] Verbal  [x] Demo  [] Written  Comprehension of Education:  [x] Verbalizes understanding. [x] Demonstrates understanding. [x] Needs review. [] Demonstrates/verbalizes HEP/Ed previously given. Plan: [x] Continue current frequency toward long and short term goals. [x] Specific Instructions for subsequent treatments: cont per POC      Time In: 0708           Time Out: 0804    Electronically signed by:   Sagar Medina PT

## 2022-05-05 ENCOUNTER — HOSPITAL ENCOUNTER (OUTPATIENT)
Dept: PHYSICAL THERAPY | Facility: CLINIC | Age: 32
Setting detail: THERAPIES SERIES
Discharge: HOME | End: 2022-05-05
Payer: COMMERCIAL

## 2022-05-05 DIAGNOSIS — F90.0 ATTENTION DEFICIT HYPERACTIVITY DISORDER (ADHD), PREDOMINANTLY INATTENTIVE TYPE: ICD-10-CM

## 2022-05-05 PROCEDURE — 97110 THERAPEUTIC EXERCISES: CPT

## 2022-05-05 NOTE — FLOWSHEET NOTE
[] [address]  [address]  [phone]  F: [phone] [] [address]  [facility] 36   [address]  P: [phone]  F: [phone] [x] [place] [facility]  [address]  P: [phone]  F: [phone] [] [email or web address]  P: [phone]  F: [phone] [] [address]  [facility]   [address]   [place]: [phone]  F: [phone]      Physical Therapy Daily Treatment Note    Date:  2022  Patient Name:  Rob Denton    :  1990  MRN: [id number]  Physician: Elsa Castillo MD                   Insurance: [employer] (60/60 Visits Approved)  Medical Diagnosis: M75.22- Biceps tendinitis of left shoulder                      Rehab Codes: M25.512, M25.612  Onset Date: 22                                       Next 's appt. : --  Visit# / total visits:      Cancels/No Shows: 0/0    Subjective: Pt reports to PT with no pain, however states that he was having some increased pain at work last night. Notes that he used a massage gun to help with pain, which he states really helped. Pain:  [] Yes  [x] No  Location: L shoulder/bicep   Pain Rating: (0-10 scale) -/10  Pain altered Tx:  [x] No  [] Yes  Action:  Comments:    Objective:       Todays Treatment:  Precautions: General                            Exercises: Work on left shoulder Biceps/ RTC/Scapular stabilizer strengthening  Exercise  L Shoulder/Biceps Reps/ Time Weight/ Level Comments    UBE/ Airdyne   6'     UBE in use                UT S 3x30\"     x   Levator S 3x30\"      x   Doorway pec S 3x30\"    90*, 120* x   Doorway rhomboid S  3x30\"        Doorway lat S  3x30\"    Both arms bilaterally    Biceps bar S 3x30\"                   Prone scap retraction 20x5\"      Prone scap depress 20x5\"      Prone W's 2x12, 5\"   x              3 way curls         Biceps eccentric curls 3x12 10#  x                     T-band:          Rows 2x10, 5\" blue     Ext 2x10, 5\" blue     ER 2x10 Blue     ER Walkouts 2x10 Blue  x          Banded wall walks 2x12 Orange Added diagonal today for 2x10 x   Banded flexion pulses 2x10 Orange  x                 Other: Hypervolt L biceps, pec- Held today        Specific Instructions for next treatment: Continue tx per POC, perform manual along L clavicle musculature        Treatment Charges: Mins Units   []  Modalities     [x]  Ther Exercise 49 3   []  Manual Therapy     []  Ther Activities     []  Aquatics     []  Vasocompression     []  Other     Total Treatment time 49 3       Assessment: [x] Progressing toward goals. Continued with tx per log above with no complaints. Held warm-up d/t pt stating that he completed a run this morning and would like to hold off on using the bike. Good recall with all stretches completed during session. Progressed program with increased reps with biceps curls and prone W's, as well as adding flexion pulses, horizontal wall walks, and ER walkouts. Pt denies any pain with all progressions today, but does state that he could feel some clicking in his shoulder during treatment, but again denies pain during occurrence. Continue to progress strength program as pt tolerates. [] No change. [] Other:  [x] Patient would continue to benefit from skilled physical therapy services in order to: Improve verónica UE flexibility, improve L UE ROM, improve L UE strength, reduce pain, and improve tolerance to activity to allow pt to return to working out recreationally without issue        Goals  MET NOT MET ON-  GOING  Details   Date Addressed: NA           STG: To be met in 8 treatments  ?          1. ? Pain: Decrease pain levels to 5/10 with ADLs/working out to help improve tolerance to working out recreationally. []??  []??  []??      2. ? ROM: Increase flexibility in L UE to help improve posture and shoulder mechanics with activity. []??  []??  []??      3. Pt will demonstrate L shoulder ROM equal to R in order improve symmetry and reduce need for compensations with activity. []??  []??  []??      4. Independent with Home Exercise Programs []? ?  []??  []??        []? ?  []??  []??      Date Addressed: NA           LTG: To be met in 16 treatments           1. Improve score on assessment tool UEFS from 18% impairment to less than 10% impairment, demonstrating improved tolerance to activity. []??  []??  []??      2. Reduce pain levels to 1/10 or less with ADLs/working out recreationally. []??  []??  []??      3. ? Strength: Increase L UE strength to 5/5 grossly to help reduce need for compensations with working out recreationally. []??  []??  []??                           Patient goals: \"Have no pain and be able to work out normal        Pt. Education:  [x] Yes  [] No  [x] Reviewed Prior HEP/Ed  Method of Education: [x] Verbal  [x] Demo  [] Written  Comprehension of Education:  [x] Verbalizes understanding. [x] Demonstrates understanding. [x] Needs review. [] Demonstrates/verbalizes HEP/Ed previously given. Plan: [x] Continue current frequency toward long and short term goals. [x] Specific Instructions for subsequent treatments: cont per POC      Time In: 0701           Time Out: 3152    Electronically signed by:   Cammy Hodge PT

## 2022-05-06 RX ORDER — DEXTROAMPHETAMINE SACCHARATE, AMPHETAMINE ASPARTATE MONOHYDRATE, DEXTROAMPHETAMINE SULFATE AND AMPHETAMINE SULFATE 5; 5; 5; 5 MG/1; MG/1; MG/1; MG/1
20 CAPSULE, EXTENDED RELEASE ORAL EVERY MORNING
Qty: 30 CAPSULE | Refills: 0 | Status: SHIPPED | OUTPATIENT
Start: 2022-05-06 | End: 2022-05-31 | Stop reason: SDUPTHER

## 2022-05-06 RX ORDER — CYCLOBENZAPRINE HCL 10 MG
10 TABLET ORAL PRN
Qty: 30 TABLET | Refills: 0 | Status: SHIPPED | OUTPATIENT
Start: 2022-05-06 | End: 2022-05-31 | Stop reason: SDUPTHER

## 2022-05-10 ENCOUNTER — HOSPITAL ENCOUNTER (OUTPATIENT)
Dept: PHYSICAL THERAPY | Facility: CLINIC | Age: 32
Setting detail: THERAPIES SERIES
Discharge: HOME | End: 2022-05-10
Payer: COMMERCIAL

## 2022-05-10 NOTE — FLOWSHEET NOTE
[] [address]  [address]    [phone]  F: [phone]   [] [address]  [facility] 36   [address]  P: [phone]  F: [phone]  [x] [address] &  Therapy  [address]  P: [phone]  F: [phone] [] [address]  P: [phone]  F: [phone]  [] [address]  [address] [facility] [address]   [address]   [place]: [phone]  F: [phone]   [] [address]  [place]: [phone]   F: [phone]     Physical Therapy Cancel/No Show note    Date: 5/10/2022  Patient: Rashad Shah  : 1990  MRN: [id number]    Cancels/No Shows to date:     For today's appointment patient:    [x]  Cancelled    [] Rescheduled appointment    [] No-show     Reason given by patient:    []  Patient ill    []  Conflicting appointment    [] No transportation      [] Conflict with work    [x] No reason given    [] Weather related    [] COVID-19    [] Other:      Comments: pt called and canceled and stated he will call to get back on schedule.          [] Next appointment was confirmed    Electronically signed by: Chapito Colvin PTA

## 2022-05-12 ENCOUNTER — APPOINTMENT (OUTPATIENT)
Dept: PHYSICAL THERAPY | Facility: CLINIC | Age: 32
End: 2022-05-12
Payer: COMMERCIAL

## 2022-05-31 DIAGNOSIS — F90.0 ATTENTION DEFICIT HYPERACTIVITY DISORDER (ADHD), PREDOMINANTLY INATTENTIVE TYPE: ICD-10-CM

## 2022-06-01 RX ORDER — CYCLOBENZAPRINE HCL 10 MG
10 TABLET ORAL PRN
Qty: 30 TABLET | Refills: 0 | Status: SHIPPED | OUTPATIENT
Start: 2022-06-01 | End: 2022-06-15 | Stop reason: SDUPTHER

## 2022-06-01 RX ORDER — DEXTROAMPHETAMINE SACCHARATE, AMPHETAMINE ASPARTATE MONOHYDRATE, DEXTROAMPHETAMINE SULFATE AND AMPHETAMINE SULFATE 5; 5; 5; 5 MG/1; MG/1; MG/1; MG/1
20 CAPSULE, EXTENDED RELEASE ORAL EVERY MORNING
Qty: 30 CAPSULE | Refills: 0 | Status: SHIPPED | OUTPATIENT
Start: 2022-06-01 | End: 2022-06-15 | Stop reason: SDUPTHER

## 2022-06-15 ENCOUNTER — TELEPHONE (OUTPATIENT)
Dept: FAMILY MEDICINE CLINIC | Age: 32
End: 2022-06-15

## 2022-06-15 RX ORDER — DEXTROAMPHETAMINE SACCHARATE, AMPHETAMINE ASPARTATE MONOHYDRATE, DEXTROAMPHETAMINE SULFATE AND AMPHETAMINE SULFATE 5; 5; 5; 5 MG/1; MG/1; MG/1; MG/1
20 CAPSULE, EXTENDED RELEASE ORAL EVERY MORNING
Qty: 30 CAPSULE | Refills: 0 | Status: SHIPPED | OUTPATIENT
Start: 2022-06-15 | End: 2022-07-08 | Stop reason: SDUPTHER

## 2022-06-15 RX ORDER — CYCLOBENZAPRINE HCL 10 MG
10 TABLET ORAL PRN
Qty: 30 TABLET | Refills: 0 | Status: SHIPPED | OUTPATIENT
Start: 2022-06-15 | End: 2022-07-08 | Stop reason: SDUPTHER

## 2022-06-15 NOTE — TELEPHONE ENCOUNTER
The pharmacy [facility]) called with a question re: the RX for Flexeril, she states she needs a frequency for it, ie: up to three times a day.     Please advise

## 2022-07-08 ENCOUNTER — HOSPITAL ENCOUNTER (OUTPATIENT)
Age: 32
Setting detail: SPECIMEN
Discharge: HOME | End: 2022-07-08

## 2022-07-08 ENCOUNTER — OFFICE VISIT (OUTPATIENT)
Dept: PRIMARY CARE CLINIC | Age: 32
End: 2022-07-08
Payer: COMMERCIAL

## 2022-07-08 VITALS
HEIGHT: 77 IN | OXYGEN SATURATION: 97 % | SYSTOLIC BLOOD PRESSURE: 122 MMHG | BODY MASS INDEX: 29.28 KG/M2 | DIASTOLIC BLOOD PRESSURE: 84 MMHG | HEART RATE: 87 BPM | RESPIRATION RATE: 12 BRPM | WEIGHT: 248 LBS

## 2022-07-08 DIAGNOSIS — Z13.6 ENCOUNTER FOR LIPID SCREENING FOR CARDIOVASCULAR DISEASE: ICD-10-CM

## 2022-07-08 DIAGNOSIS — G47.09 OTHER INSOMNIA: ICD-10-CM

## 2022-07-08 DIAGNOSIS — S30.0XXD CONTUSION OF LOWER BACK, SUBSEQUENT ENCOUNTER: ICD-10-CM

## 2022-07-08 DIAGNOSIS — R68.82 LOW LIBIDO: ICD-10-CM

## 2022-07-08 DIAGNOSIS — Z13.29 SCREENING FOR THYROID DISORDER: ICD-10-CM

## 2022-07-08 DIAGNOSIS — Z13.0 SCREENING FOR DEFICIENCY ANEMIA: ICD-10-CM

## 2022-07-08 DIAGNOSIS — Z13.220 ENCOUNTER FOR LIPID SCREENING FOR CARDIOVASCULAR DISEASE: ICD-10-CM

## 2022-07-08 DIAGNOSIS — F90.0 ATTENTION DEFICIT HYPERACTIVITY DISORDER (ADHD), PREDOMINANTLY INATTENTIVE TYPE: Primary | ICD-10-CM

## 2022-07-08 DIAGNOSIS — R17 SERUM TOTAL BILIRUBIN ELEVATED: Primary | ICD-10-CM

## 2022-07-08 LAB
ALBUMIN SERPL-MCNC: 5 G/DL (ref 3.5–5.2)
ALBUMIN/GLOBULIN RATIO: 2 (ref 1–2.5)
ALP BLD-CCNC: 62 U/L (ref 40–129)
ALT SERPL-CCNC: 21 U/L (ref 5–41)
ANION GAP SERPL CALCULATED.3IONS-SCNC: 12 MMOL/L (ref 9–17)
AST SERPL-CCNC: 20 U/L
BILIRUB SERPL-MCNC: 2.25 MG/DL (ref 0.3–1.2)
BUN BLDV-MCNC: 12 MG/DL (ref 6–20)
CALCIUM SERPL-MCNC: 9.9 MG/DL (ref 8.6–10.4)
CHLORIDE BLD-SCNC: 99 MMOL/L (ref 98–107)
CHOLESTEROL/HDL RATIO: 3.5
CHOLESTEROL: 193 MG/DL
CO2: 27 MMOL/L (ref 20–31)
CREAT SERPL-MCNC: 1.11 MG/DL (ref 0.7–1.2)
GFR AFRICAN AMERICAN: >60 ML/MIN
GFR NON-AFRICAN AMERICAN: >60 ML/MIN
GFR SERPL CREATININE-BSD FRML MDRD: ABNORMAL ML/MIN/{1.73_M2}
GLUCOSE FASTING: 85 MG/DL (ref 70–99)
HCT VFR BLD CALC: 48.8 % (ref 40.7–50.3)
HDLC SERPL-MCNC: 55 MG/DL
HEMOGLOBIN: 16.6 G/DL (ref 13–17)
LDL CHOLESTEROL: 112 MG/DL (ref 0–130)
MCH RBC QN AUTO: 28.4 PG (ref 25.2–33.5)
MCHC RBC AUTO-ENTMCNC: 34 G/DL (ref 28.4–34.8)
MCV RBC AUTO: 83.6 FL (ref 82.6–102.9)
NRBC AUTOMATED: 0 PER 100 WBC
PDW BLD-RTO: 12.3 % (ref 11.8–14.4)
PLATELET # BLD: 265 K/UL (ref 138–453)
PMV BLD AUTO: 10.9 FL (ref 8.1–13.5)
POTASSIUM SERPL-SCNC: 4.2 MMOL/L (ref 3.7–5.3)
RBC # BLD: 5.84 M/UL (ref 4.21–5.77)
SEX HORMONE BINDING GLOBULIN: 27 NMOL/L (ref 11–80)
SODIUM BLD-SCNC: 138 MMOL/L (ref 135–144)
TESTOSTERONE FREE-NONMALE: 76.1 PG/ML (ref 47–244)
TESTOSTERONE TOTAL: 341 NG/DL (ref 220–1000)
TOTAL PROTEIN: 7.5 G/DL (ref 6.4–8.3)
TRIGL SERPL-MCNC: 131 MG/DL
TSH SERPL DL<=0.05 MIU/L-ACNC: 1.59 UIU/ML (ref 0.3–5)
VITAMIN B-12: 913 PG/ML (ref 232–1245)
WBC # BLD: 6.9 K/UL (ref 3.5–11.3)

## 2022-07-08 PROCEDURE — 99214 OFFICE O/P EST MOD 30 MIN: CPT | Performed by: PHYSICIAN ASSISTANT

## 2022-07-08 RX ORDER — DEXTROAMPHETAMINE SACCHARATE, AMPHETAMINE ASPARTATE MONOHYDRATE, DEXTROAMPHETAMINE SULFATE AND AMPHETAMINE SULFATE 5; 5; 5; 5 MG/1; MG/1; MG/1; MG/1
20 CAPSULE, EXTENDED RELEASE ORAL EVERY MORNING
Qty: 30 CAPSULE | Refills: 0 | Status: SHIPPED | OUTPATIENT
Start: 2022-07-08 | End: 2022-09-12 | Stop reason: SDUPTHER

## 2022-07-08 RX ORDER — TRAZODONE HYDROCHLORIDE 50 MG/1
50 TABLET ORAL NIGHTLY
Qty: 30 TABLET | Refills: 0 | Status: SHIPPED | OUTPATIENT
Start: 2022-07-08 | End: 2022-08-03

## 2022-07-08 RX ORDER — CYCLOBENZAPRINE HCL 10 MG
10 TABLET ORAL PRN
Qty: 30 TABLET | Refills: 0 | Status: SHIPPED | OUTPATIENT
Start: 2022-07-08 | End: 2022-07-19 | Stop reason: SDUPTHER

## 2022-07-08 SDOH — ECONOMIC STABILITY: FOOD INSECURITY: WITHIN THE PAST 12 MONTHS, YOU WORRIED THAT YOUR FOOD WOULD RUN OUT BEFORE YOU GOT MONEY TO BUY MORE.: NEVER TRUE

## 2022-07-08 SDOH — ECONOMIC STABILITY: FOOD INSECURITY: WITHIN THE PAST 12 MONTHS, THE FOOD YOU BOUGHT JUST DIDN'T LAST AND YOU DIDN'T HAVE MONEY TO GET MORE.: NEVER TRUE

## 2022-07-08 ASSESSMENT — PATIENT HEALTH QUESTIONNAIRE - PHQ9
SUM OF ALL RESPONSES TO PHQ QUESTIONS 1-9: 0
2. FEELING DOWN, DEPRESSED OR HOPELESS: 0
1. LITTLE INTEREST OR PLEASURE IN DOING THINGS: 0
SUM OF ALL RESPONSES TO PHQ QUESTIONS 1-9: 0
SUM OF ALL RESPONSES TO PHQ9 QUESTIONS 1 & 2: 0

## 2022-07-08 ASSESSMENT — ENCOUNTER SYMPTOMS
COUGH: 0
BACK PAIN: 0
EYE PAIN: 0
VOMITING: 0
RHINORRHEA: 0
SHORTNESS OF BREATH: 0
NAUSEA: 0
ABDOMINAL PAIN: 0
SINUS PAIN: 0
DIARRHEA: 0
CONSTIPATION: 0

## 2022-07-08 ASSESSMENT — SOCIAL DETERMINANTS OF HEALTH (SDOH): HOW HARD IS IT FOR YOU TO PAY FOR THE VERY BASICS LIKE FOOD, HOUSING, MEDICAL CARE, AND HEATING?: NOT HARD AT ALL

## 2022-07-08 NOTE — PROGRESS NOTES
[facility]  [address]   [address]  [address] [address]  Dept: [phone]  Dept Fax: [phone]    Marian Edwards is a 28 y.o. male who presents today for his medical conditions/complaints as noted below. Chief Complaint   Patient presents with    Annual Exam    ADHD    Insomnia     not sleeping well        HPI:     Patient presents to the office for medication follow-up and insomnia. Patient reports that he has had recurrent issues with insomnia. He feels that it is related to his work. He works third shift and he has been working 70-80 hours a week. He describes significant difficulty falling asleep and staying asleep. He reports that ADHD is well managed on Adderall. No side effects. Patient would like yearly labs. He specifically has concern for lower energy and some low libido. This has been an ongoing issue. He would like to test testosterone. No other concerns or complaints. BP stable. Weight slightly increased from last visit.       No results found for: LABA1C          ( goal A1C is < 7)   No results found for: LABMICR  No results found for: LDLCHOLESTEROL, LDLCALC    (goal LDL is <100)   AST (U/L)   Date Value   02/02/2020 15     ALT (U/L)   Date Value   02/02/2020 16     BUN (mg/dL)   Date Value   02/02/2020 9     BP Readings from Last 3 Encounters:   07/08/22 122/84   07/09/21 104/74   06/07/21 118/78          (goal 120/80)    Past Medical History:   Diagnosis Date    ADHD (attention deficit hyperactivity disorder)       Past Surgical History:   Procedure Laterality Date    APPENDECTOMY      HERNIA REPAIR Left     inguinal hernia repair       Family History   Problem Relation Age of Onset    Other Mother     High Blood Pressure Father        Social History     Tobacco Use    Smoking status: Never Smoker    Smokeless tobacco: Former User     Types: Chew   Substance Use Topics    Alcohol use: Yes     Comment: socially      Current Outpatient Medications   Medication Sig Dispense Refill    amphetamine-dextroamphetamine (ADDERALL XR) 20 MG extended release capsule Take 1 capsule by mouth every morning for 30 days. 30 capsule 0    cyclobenzaprine (FLEXERIL) 10 MG tablet Take 1 tablet by mouth as needed for Muscle spasms 30 tablet 0    traZODone (DESYREL) 50 MG tablet Take 1 tablet by mouth nightly 30 tablet 0     No current facility-administered medications for this visit. No Known Allergies    Health Maintenance   Topic Date Due    Varicella vaccine (1 of 2 - 2-dose childhood series) Never done    COVID-19 Vaccine (1) Never done    HIV screen  Never done    Hepatitis C screen  Never done    DTaP/Tdap/Td vaccine (1 - Tdap) Never done    Depression Screen  06/02/2022    Flu vaccine (1) 09/01/2022    Hepatitis A vaccine  Aged Out    Hepatitis B vaccine  Aged Out    Hib vaccine  Aged Out    Meningococcal (ACWY) vaccine  Aged Out    Pneumococcal 0-64 years Vaccine  Aged Out       Subjective:      Review of Systems   Constitutional: Positive for fatigue. Negative for chills and fever. HENT: Negative for congestion, rhinorrhea and sinus pain. Eyes: Negative for pain. Respiratory: Negative for cough and shortness of breath. Cardiovascular: Negative for chest pain and leg swelling. Gastrointestinal: Negative for abdominal pain, constipation, diarrhea, nausea and vomiting. Genitourinary: Negative for difficulty urinating, enuresis and testicular pain.        + low libido   Musculoskeletal: Negative for arthralgias, back pain and myalgias. Skin: Negative for rash. Neurological: Negative for dizziness and headaches. Psychiatric/Behavioral: Positive for sleep disturbance. Negative for confusion. The patient is not nervous/anxious. All other systems reviewed and are negative. Objective:     Physical Exam  Vitals and nursing note reviewed.    Constitutional:       General: He is not in acute distress. Appearance: Normal appearance. HENT:      Head: Normocephalic. Mouth/Throat:      Mouth: Mucous membranes are moist.   Eyes:      Extraocular Movements: Extraocular movements intact. Conjunctiva/sclera: Conjunctivae normal.      Pupils: Pupils are equal, round, and reactive to light. Cardiovascular:      Rate and Rhythm: Normal rate and regular rhythm. Pulses: Normal pulses. Heart sounds: Normal heart sounds. Pulmonary:      Effort: Pulmonary effort is normal.      Breath sounds: Normal breath sounds. Abdominal:      General: Abdomen is flat. Bowel sounds are normal.      Palpations: Abdomen is soft. Tenderness: There is no abdominal tenderness. Musculoskeletal:      Cervical back: Normal range of motion. Right lower leg: No edema. Left lower leg: No edema. Lymphadenopathy:      Cervical: No cervical adenopathy. Skin:     General: Skin is warm. Capillary Refill: Capillary refill takes less than 2 seconds. Neurological:      General: No focal deficit present. Mental Status: He is alert and oriented to person, place, and time. Psychiatric:         Mood and Affect: Mood normal.         Behavior: Behavior normal.       /84   Pulse 87   Resp 12   Ht 6' 5\" (1.956 m)   Wt 248 lb (112.5 kg)   SpO2 97%   BMI 29.41 kg/m²     Assessment:       ICD-10-CM    1. Attention deficit hyperactivity disorder (ADHD), predominantly inattentive type  F90.0 amphetamine-dextroamphetamine (ADDERALL XR) 20 MG extended release capsule   2. Contusion of lower back, subsequent encounter  S30. 0XXD cyclobenzaprine (FLEXERIL) 10 MG tablet   3. Other insomnia  G47.09 traZODone (DESYREL) 50 MG tablet   4. Low libido  R68.82 Testosterone, Free   5. Screening for deficiency anemia  Z13.0 CBC     Vitamin B12   6. Encounter for lipid screening for cardiovascular disease  Z13.220 Lipid Panel    Z13.6 Comprehensive Metabolic Panel, Fasting   7.  Screening for thyroid disorder  Z13.29 TSH With Reflex Ft4            Plan:       1. ADHD is stable. Continue Adderall 20 mg extended release once daily. 2.  Patient to continue Flexeril as needed for spine. .  Plan to start trazodone nightly as needed for insomnia. Discussed important sleep hygiene habits. We also discussed that is likely due to him being overworked and third shift. 4.  Plan to evaluate testosterone with low libido and fatigue.  5-7. Patient agreeable to screening labs. Return in about 4 months (around 11/8/2022) for medication recheck. Orders Placed This Encounter   Procedures    CBC     Standing Status:   Future     Number of Occurrences:   1     Standing Expiration Date:   7/8/2023    Lipid Panel     Standing Status:   Future     Number of Occurrences:   1     Standing Expiration Date:   10/8/2022     Order Specific Question:   Is Patient Fasting?/# of Hours     Answer: Fast 8-10 hours    Comprehensive Metabolic Panel, Fasting     Standing Status:   Future     Number of Occurrences:   1     Standing Expiration Date:   7/8/2023    TSH With Reflex Ft4     Standing Status:   Future     Number of Occurrences:   1     Standing Expiration Date:   7/8/2023    Vitamin B12     Standing Status:   Future     Number of Occurrences:   1     Standing Expiration Date:   7/8/2023    Testosterone, Free     Standing Status:   Future     Number of Occurrences:   1     Standing Expiration Date:   7/8/2023         Patient given educational materials - see patient instructions. Discussed use, benefit, and side effects of prescribed medications. All patient questions answered. Pt voiced understanding. Reviewed health maintenance. Instructed to continue current medications, diet and exercise. Patient agreed with treatment plan. Follow up as directed.         Electronically signed by Celia Tavera PA-C on 7/8/2022 at 8:11 AM

## 2022-07-11 ENCOUNTER — PATIENT MESSAGE (OUTPATIENT)
Dept: PRIMARY CARE CLINIC | Age: 32
End: 2022-07-11

## 2022-07-11 DIAGNOSIS — S30.0XXD CONTUSION OF LOWER BACK, SUBSEQUENT ENCOUNTER: ICD-10-CM

## 2022-07-11 NOTE — TELEPHONE ENCOUNTER
From: Sammi Varghese  To: Del Laura  Sent: 7/11/2022 4:43 PM EDT  Subject: Trazodone    I think its giving me headaches, I dont get them very often, and have had a moderate to dull one for the last 20hrs

## 2022-07-19 RX ORDER — CYCLOBENZAPRINE HCL 10 MG
10 TABLET ORAL 3 TIMES DAILY PRN
Qty: 30 TABLET | Refills: 0 | Status: SHIPPED | OUTPATIENT
Start: 2022-07-19 | End: 2022-09-12 | Stop reason: SDUPTHER

## 2022-08-03 DIAGNOSIS — G47.09 OTHER INSOMNIA: ICD-10-CM

## 2022-08-03 RX ORDER — TRAZODONE HYDROCHLORIDE 50 MG/1
TABLET ORAL
Qty: 30 TABLET | Refills: 0 | Status: SHIPPED | OUTPATIENT
Start: 2022-08-03 | End: 2022-08-31 | Stop reason: SDUPTHER

## 2022-08-31 DIAGNOSIS — G47.09 OTHER INSOMNIA: ICD-10-CM

## 2022-08-31 RX ORDER — TRAZODONE HYDROCHLORIDE 50 MG/1
TABLET ORAL
Qty: 30 TABLET | Refills: 2 | Status: SHIPPED | OUTPATIENT
Start: 2022-08-31

## 2022-09-12 DIAGNOSIS — S30.0XXD CONTUSION OF LOWER BACK, SUBSEQUENT ENCOUNTER: ICD-10-CM

## 2022-09-12 DIAGNOSIS — F90.0 ATTENTION DEFICIT HYPERACTIVITY DISORDER (ADHD), PREDOMINANTLY INATTENTIVE TYPE: ICD-10-CM

## 2022-09-12 RX ORDER — DEXTROAMPHETAMINE SACCHARATE, AMPHETAMINE ASPARTATE MONOHYDRATE, DEXTROAMPHETAMINE SULFATE AND AMPHETAMINE SULFATE 5; 5; 5; 5 MG/1; MG/1; MG/1; MG/1
20 CAPSULE, EXTENDED RELEASE ORAL EVERY MORNING
Qty: 30 CAPSULE | Refills: 0 | Status: SHIPPED | OUTPATIENT
Start: 2022-09-12 | End: 2022-10-26

## 2022-09-12 RX ORDER — CYCLOBENZAPRINE HCL 10 MG
10 TABLET ORAL 3 TIMES DAILY PRN
Qty: 30 TABLET | Refills: 0 | Status: SHIPPED | OUTPATIENT
Start: 2022-09-12

## 2022-10-20 ENCOUNTER — PATIENT MESSAGE (OUTPATIENT)
Dept: PRIMARY CARE CLINIC | Age: 32
End: 2022-10-20

## 2022-10-26 ENCOUNTER — OFFICE VISIT (OUTPATIENT)
Dept: PRIMARY CARE CLINIC | Age: 32
End: 2022-10-26
Payer: COMMERCIAL

## 2022-10-26 ENCOUNTER — HOSPITAL ENCOUNTER (OUTPATIENT)
Age: 32
Setting detail: SPECIMEN
Discharge: HOME | End: 2022-10-26

## 2022-10-26 VITALS
BODY MASS INDEX: 30.6 KG/M2 | WEIGHT: 259.2 LBS | HEIGHT: 77 IN | HEART RATE: 105 BPM | DIASTOLIC BLOOD PRESSURE: 78 MMHG | RESPIRATION RATE: 16 BRPM | OXYGEN SATURATION: 97 % | SYSTOLIC BLOOD PRESSURE: 122 MMHG

## 2022-10-26 DIAGNOSIS — R17 SERUM TOTAL BILIRUBIN ELEVATED: ICD-10-CM

## 2022-10-26 DIAGNOSIS — F90.0 ATTENTION DEFICIT HYPERACTIVITY DISORDER (ADHD), PREDOMINANTLY INATTENTIVE TYPE: Primary | ICD-10-CM

## 2022-10-26 DIAGNOSIS — G47.09 OTHER INSOMNIA: ICD-10-CM

## 2022-10-26 DIAGNOSIS — F41.1 GENERALIZED ANXIETY DISORDER: ICD-10-CM

## 2022-10-26 LAB
BILIRUB SERPL-MCNC: 2 MG/DL (ref 0.3–1.2)
BILIRUBIN DIRECT: 0.3 MG/DL

## 2022-10-26 PROCEDURE — 99214 OFFICE O/P EST MOD 30 MIN: CPT | Performed by: PHYSICIAN ASSISTANT

## 2022-10-26 ASSESSMENT — PATIENT HEALTH QUESTIONNAIRE - PHQ9
SUM OF ALL RESPONSES TO PHQ QUESTIONS 1-9: 2
SUM OF ALL RESPONSES TO PHQ9 QUESTIONS 1 & 2: 2
2. FEELING DOWN, DEPRESSED OR HOPELESS: 1
SUM OF ALL RESPONSES TO PHQ QUESTIONS 1-9: 2
1. LITTLE INTEREST OR PLEASURE IN DOING THINGS: 1

## 2022-10-26 NOTE — LETTER
[address] [facility]  [address] [facility].  Phone: [phone]  [facility]ax[phone]        October 26, 2022     Patient: Alison Aguiar   YOB: 1990   Date of Visit: 10/26/2022       To Whom It May Concern: It is my medical opinion that Kavitha Cochran should remain out of work until 12/5/22. If you have any questions or concerns, please don't hesitate to call.     Sincerely,        Biju Anaya PA-C

## 2022-10-26 NOTE — PROGRESS NOTES
[facility] PRIMARY CARE  . [address]  [address] [address]  Dept: [phone]  Dept Fax: [phone]    Jennifer Silva is a 28 y.o. male who presents today for his medical conditions/complaints as noted below. Chief Complaint   Patient presents with    Stress     And anxiety, noticing issues with sleep and diarrhea with increase work related stress; has been using adderall intermittently       HPI:     Patient presents to the office to discuss increasing anxiety and stress. He states over the past several months he is noticing increased stress and anxiety. This is very related to his increased working hours without any time off. He states he is working nearly every day of the week and sometimes close to 80 hours. He is noticing worsening sleeping habits/quality of sleep. He states that ADHD is fairly stable, but he does not use Adderall daily as he forgets. Patient describes increasing anxiety due to the fact that he has no life outside of work. He has noticed an increase in loose stools which seem directly related to stress. Denies any blood in the stool, rapid weight loss, significant abdominal pain. He does admit to mild episodes of down mood, but denies significant depression. No thoughts of harming himself or others. Patient has seen psychology in the past for his mental health. He would like to discuss FMLA. No other complaints or concerns. BP stable. Weight slightly increased.       No results found for: LABA1C          ( goal A1C is < 7)   No results found for: LABMICR  LDL Cholesterol (mg/dL)   Date Value   07/08/2022 112       (goal LDL is <100)   AST (U/L)   Date Value   07/08/2022 20     ALT (U/L)   Date Value   07/08/2022 21     BUN (mg/dL)   Date Value   07/08/2022 12     BP Readings from Last 3 Encounters:   10/26/22 122/78   07/08/22 122/84   07/09/21 104/74          (goal 120/80)    Past Medical History:   Diagnosis Date ADHD (attention deficit hyperactivity disorder)       Past Surgical History:   Procedure Laterality Date    APPENDECTOMY      HERNIA REPAIR Left     inguinal hernia repair       Family History   Problem Relation Age of Onset    Other Mother     High Blood Pressure Father        Social History     Tobacco Use    Smoking status: Never    Smokeless tobacco: Former     Types: Chew   Substance Use Topics    Alcohol use: Yes     Comment: socially      Current Outpatient Medications   Medication Sig Dispense Refill    amphetamine-dextroamphetamine (ADDERALL XR) 20 MG extended release capsule Take 1 capsule by mouth every morning for 30 days. 30 capsule 0    cyclobenzaprine (FLEXERIL) 10 MG tablet Take 1 tablet by mouth 3 times daily as needed for Muscle spasms 30 tablet 0    traZODone (DESYREL) 50 MG tablet TAKE ONE TABLET BY MOUTH ONCE NIGHTLY 30 tablet 2     No current facility-administered medications for this visit. No Known Allergies    Health Maintenance   Topic Date Due    COVID-19 Vaccine (1) Never done    Varicella vaccine (1 of 2 - 2-dose childhood series) Never done    HIV screen  Never done    Hepatitis C screen  Never done    DTaP/Tdap/Td vaccine (1 - Tdap) Never done    Flu vaccine (1) 10/26/2023 (Originally 8/1/2022)    Depression Screen  07/08/2023    Hepatitis A vaccine  Aged Out    Hib vaccine  Aged Out    Meningococcal (ACWY) vaccine  Aged Out    Pneumococcal 0-64 years Vaccine  Aged Out       Subjective:      Review of Systems   Constitutional:  Negative for chills, fatigue and fever. HENT:  Negative for congestion, rhinorrhea and sinus pain. Eyes:  Negative for pain. Respiratory:  Negative for cough and shortness of breath. Cardiovascular:  Negative for chest pain and leg swelling. Gastrointestinal:  Negative for abdominal pain, constipation, diarrhea, nausea and vomiting. Genitourinary:  Negative for difficulty urinating, enuresis and testicular pain.    Musculoskeletal:  Negative for arthralgias, back pain and myalgias. Skin:  Negative for rash. Neurological:  Negative for dizziness and headaches. Psychiatric/Behavioral:  Positive for decreased concentration and sleep disturbance. Negative for confusion. The patient is nervous/anxious. All other systems reviewed and are negative. Objective:     Physical Exam  Vitals and nursing note reviewed. Constitutional:       General: He is not in acute distress. Appearance: Normal appearance. He is normal weight. HENT:      Head: Normocephalic. Mouth/Throat:      Mouth: Mucous membranes are moist.   Eyes:      Extraocular Movements: Extraocular movements intact. Conjunctiva/sclera: Conjunctivae normal.      Pupils: Pupils are equal, round, and reactive to light. Cardiovascular:      Rate and Rhythm: Normal rate and regular rhythm. Pulses: Normal pulses. Heart sounds: Normal heart sounds. No murmur heard. Pulmonary:      Effort: Pulmonary effort is normal. No respiratory distress. Breath sounds: Normal breath sounds. Abdominal:      General: Abdomen is flat. Musculoskeletal:      Cervical back: Normal range of motion. Right lower leg: No edema. Left lower leg: No edema. Lymphadenopathy:      Cervical: No cervical adenopathy. Skin:     General: Skin is warm. Capillary Refill: Capillary refill takes less than 2 seconds. Neurological:      General: No focal deficit present. Mental Status: He is alert and oriented to person, place, and time. Psychiatric:         Mood and Affect: Mood normal.         Behavior: Behavior normal.         Thought Content: Thought content normal.         Judgment: Judgment normal.     /78 (Site: Left Upper Arm, Position: Sitting, Cuff Size: Large Adult)   Pulse (!) 105   Resp 16   Ht 6' 5\" (1.956 m)   Wt 259 lb 3.2 oz (117.6 kg)   SpO2 97%   BMI 30.74 kg/m²     Assessment:       ICD-10-CM    1.  Attention deficit hyperactivity disorder (ADHD), predominantly inattentive type  F90.0       2. Other insomnia  G47.09 [facility] \[facility]\"" Primary [facility])      3. Generalized anxiety disorder  F41.1 [facility])               Plan:      1-3. I had a long discussion with patient about the management of mental health. He currently is taking Adderall for management of ADHD which is typically effective when he uses it, but he is having issues due to work schedule and sleeping. Patient is dealing with likely third shift insomnia and anxiety. We discussed medications, but he defers at this time. I advised on counseling. I discussed FMLA with patient for time off in order to achieve work-life balance and reduce stress. He is to call the office with any changes otherwise we will follow-up in 4 weeks. Return in about 4 weeks (around 11/23/2022) for recheck anxiety. Orders Placed This Encounter   Procedures    [facility])     Referral Priority:   Routine     Referral Type:   Behavioral Health     Referral Reason:   Specialty Services Required     Requested Specialty:   Bac[facility] [place]     Number of Visits Requested:   1         Patient given educational materials - see patient instructions. Discussed use, benefit, and side effects of prescribedmedications. All patient questions answered. Pt voiced understanding. Reviewed health maintenance. Instructed to continue current medications, diet and exercise. Patient agreed with treatment plan. Follow up as directed. Electronically signed by Paige Henson PA-C on 10/26/2022 at 2:46 PM    I spent a total of 30 minutes with this patient encounter including reviewing prior charts, face-to-face, documentation.

## 2022-11-02 ASSESSMENT — ENCOUNTER SYMPTOMS
NAUSEA: 0
DIARRHEA: 0
BACK PAIN: 0
EYE PAIN: 0
SINUS PAIN: 0
COUGH: 0
ABDOMINAL PAIN: 0
RHINORRHEA: 0
VOMITING: 0
SHORTNESS OF BREATH: 0
CONSTIPATION: 0

## 2022-11-23 ENCOUNTER — OFFICE VISIT (OUTPATIENT)
Dept: PRIMARY CARE CLINIC | Age: 32
End: 2022-11-23
Payer: COMMERCIAL

## 2022-11-23 VITALS
HEIGHT: 77 IN | WEIGHT: 262.8 LBS | HEART RATE: 72 BPM | SYSTOLIC BLOOD PRESSURE: 114 MMHG | RESPIRATION RATE: 16 BRPM | BODY MASS INDEX: 31.03 KG/M2 | DIASTOLIC BLOOD PRESSURE: 80 MMHG | OXYGEN SATURATION: 97 %

## 2022-11-23 DIAGNOSIS — F90.0 ATTENTION DEFICIT HYPERACTIVITY DISORDER (ADHD), PREDOMINANTLY INATTENTIVE TYPE: ICD-10-CM

## 2022-11-23 DIAGNOSIS — F41.1 GENERALIZED ANXIETY DISORDER: Primary | ICD-10-CM

## 2022-11-23 PROCEDURE — 99213 OFFICE O/P EST LOW 20 MIN: CPT | Performed by: PHYSICIAN ASSISTANT

## 2022-11-23 ASSESSMENT — ENCOUNTER SYMPTOMS
SINUS PAIN: 0
CONSTIPATION: 0
EYE PAIN: 0
DIARRHEA: 0
ABDOMINAL PAIN: 0
SHORTNESS OF BREATH: 0
BACK PAIN: 0
NAUSEA: 0
COUGH: 0
VOMITING: 0
RHINORRHEA: 0

## 2022-11-23 ASSESSMENT — PATIENT HEALTH QUESTIONNAIRE - PHQ9
2. FEELING DOWN, DEPRESSED OR HOPELESS: 0
SUM OF ALL RESPONSES TO PHQ QUESTIONS 1-9: 0
SUM OF ALL RESPONSES TO PHQ QUESTIONS 1-9: 0
1. LITTLE INTEREST OR PLEASURE IN DOING THINGS: 0
SUM OF ALL RESPONSES TO PHQ QUESTIONS 1-9: 0
SUM OF ALL RESPONSES TO PHQ9 QUESTIONS 1 & 2: 0
SUM OF ALL RESPONSES TO PHQ QUESTIONS 1-9: 0

## 2022-11-23 NOTE — PROGRESS NOTES
[facility] PRIMARY CARE  . [address]  [address] [address]  Dept: [phone]  Dept Fax: [phone]    Elias Flores is a 28 y.o. male who presents today for his medical conditions/complaints as noted below. Chief Complaint   Patient presents with    Anxiety       HPI:     Patient presents to the office for recheck of anxiety and mental health. Patient has been off work for the last few weeks for management of anxiety. He reports this is been tremendous for his quality of his mental health. He is focusing on self-care. He feels that his anxiety and mental health are stable. He describes much improved sleep and no longer requires trazodone nightly for assistance. He is using Adderall as needed for management of anxiety. This is effective when needed. He states that relationships with family has improved as he is able to focus spending time with them versus constantly working. Patient did not follow with counseling as he felt that his mental health was getting better. No new or acute concerns. BP stable.       No results found for: LABA1C          ( goal A1C is < 7)   No results found for: LABMICR  LDL Cholesterol (mg/dL)   Date Value   07/08/2022 112       (goal LDL is <100)   AST (U/L)   Date Value   07/08/2022 20     ALT (U/L)   Date Value   07/08/2022 21     BUN (mg/dL)   Date Value   07/08/2022 12     BP Readings from Last 3 Encounters:   11/23/22 114/80   10/26/22 122/78   07/08/22 122/84          (goal 120/80)    Past Medical History:   Diagnosis Date    ADHD (attention deficit hyperactivity disorder)       Past Surgical History:   Procedure Laterality Date    APPENDECTOMY      HERNIA REPAIR Left     inguinal hernia repair       Family History   Problem Relation Age of Onset    Other Mother     High Blood Pressure Father        Social History     Tobacco Use    Smoking status: Never    Smokeless tobacco: Former     Types: Chew Substance Use Topics    Alcohol use: Yes     Comment: socially      Current Outpatient Medications   Medication Sig Dispense Refill    amphetamine-dextroamphetamine (ADDERALL XR) 20 MG extended release capsule Take 1 capsule by mouth every morning for 30 days. 30 capsule 0    cyclobenzaprine (FLEXERIL) 10 MG tablet Take 1 tablet by mouth 3 times daily as needed for Muscle spasms 30 tablet 0    traZODone (DESYREL) 50 MG tablet TAKE ONE TABLET BY MOUTH ONCE NIGHTLY 30 tablet 2     No current facility-administered medications for this visit. No Known Allergies    Health Maintenance   Topic Date Due    COVID-19 Vaccine (1) Never done    Varicella vaccine (1 of 2 - 2-dose childhood series) Never done    HIV screen  Never done    Hepatitis C screen  Never done    DTaP/Tdap/Td vaccine (1 - Tdap) Never done    Flu vaccine (1) 10/26/2023 (Originally 8/1/2022)    Depression Screen  11/23/2023    Hepatitis A vaccine  Aged Out    Hib vaccine  Aged Out    Meningococcal (ACWY) vaccine  Aged Out    Pneumococcal 0-64 years Vaccine  Aged Out       Subjective:      Review of Systems   Constitutional:  Negative for chills, fatigue and fever. HENT:  Negative for congestion, rhinorrhea and sinus pain. Eyes:  Negative for pain. Respiratory:  Negative for cough and shortness of breath. Cardiovascular:  Negative for chest pain and leg swelling. Gastrointestinal:  Negative for abdominal pain, constipation, diarrhea, nausea and vomiting. Genitourinary:  Negative for difficulty urinating, enuresis and testicular pain. Musculoskeletal:  Negative for arthralgias, back pain and myalgias. Skin:  Negative for rash. Neurological:  Negative for dizziness and headaches. Psychiatric/Behavioral:  Negative for confusion and sleep disturbance. The patient is not nervous/anxious. All other systems reviewed and are negative. Objective:     Physical Exam  Vitals and nursing note reviewed.    Constitutional:       General: He is not in acute distress. Appearance: Normal appearance. HENT:      Head: Normocephalic. Mouth/Throat:      Mouth: Mucous membranes are moist.   Eyes:      Extraocular Movements: Extraocular movements intact. Conjunctiva/sclera: Conjunctivae normal.      Pupils: Pupils are equal, round, and reactive to light. Cardiovascular:      Rate and Rhythm: Normal rate and regular rhythm. Pulses: Normal pulses. Heart sounds: Normal heart sounds. Pulmonary:      Effort: Pulmonary effort is normal. No respiratory distress. Breath sounds: Normal breath sounds. Musculoskeletal:      Cervical back: Normal range of motion. Right lower leg: No edema. Left lower leg: No edema. Lymphadenopathy:      Cervical: No cervical adenopathy. Skin:     General: Skin is warm. Capillary Refill: Capillary refill takes less than 2 seconds. Neurological:      General: No focal deficit present. Mental Status: He is alert and oriented to person, place, and time. Psychiatric:         Mood and Affect: Mood normal.         Behavior: Behavior normal.     /80 (Site: Left Upper Arm, Position: Sitting, Cuff Size: Large Adult)   Pulse 72   Resp 16   Ht 6' 5\" (1.956 m)   Wt 262 lb 12.8 oz (119.2 kg)   SpO2 97%   BMI 31.16 kg/m²     Assessment:       ICD-10-CM    1. Generalized anxiety disorder  F41.1       2. Attention deficit hyperactivity disorder (ADHD), predominantly inattentive type  F90.0                Plan:      1. Anxiety is well controlled with current structure of work plan. We discussed the importance of healthy relationships, worklife balance. I did recommend counseling, which she defers at this time. He is sleeping well and not requiring trazodone nightly. 2.  ADHD is stable. He is to continue Adderall 20 mg extended release for management.     We discussed that after time off is completed he can consider intermittent FMLA, otherwise I do not recommend permanent time off.    Return in about 4 months (around 3/23/2023) for medication recheck. No orders of the defined types were placed in this encounter. Patient given educational materials - see patient instructions. Discussed use, benefit, and side effects of prescribed medications. All patient questions answered. Pt voiced understanding. Reviewed health maintenance. Instructed to continue current medications, diet and exercise. Patient agreed with treatment plan. Follow up as directed.         Electronically signed by Urszula Oliveira PA-C on 11/23/2022 at 10:32 AM

## 2023-04-24 ENCOUNTER — OFFICE VISIT (OUTPATIENT)
Dept: FAMILY MEDICINE CLINIC | Age: 33
End: 2023-04-24
Payer: COMMERCIAL

## 2023-04-24 VITALS
WEIGHT: 245 LBS | BODY MASS INDEX: 28.93 KG/M2 | TEMPERATURE: 99.3 F | DIASTOLIC BLOOD PRESSURE: 70 MMHG | HEIGHT: 77 IN | OXYGEN SATURATION: 98 % | SYSTOLIC BLOOD PRESSURE: 100 MMHG | RESPIRATION RATE: 15 BRPM | HEART RATE: 96 BPM

## 2023-04-24 DIAGNOSIS — J40 BRONCHITIS: Primary | ICD-10-CM

## 2023-04-24 PROCEDURE — 99213 OFFICE O/P EST LOW 20 MIN: CPT | Performed by: NURSE PRACTITIONER

## 2023-04-24 RX ORDER — AZITHROMYCIN 250 MG/1
TABLET, FILM COATED ORAL
Qty: 1 PACKET | Refills: 0 | Status: SHIPPED | OUTPATIENT
Start: 2023-04-24 | End: 2023-05-04

## 2023-04-24 RX ORDER — PREDNISONE 20 MG/1
20 TABLET ORAL 2 TIMES DAILY
Qty: 10 TABLET | Refills: 0 | Status: SHIPPED | OUTPATIENT
Start: 2023-04-24 | End: 2023-04-29

## 2023-04-24 RX ORDER — BENZONATATE 200 MG/1
200 CAPSULE ORAL 3 TIMES DAILY PRN
Qty: 30 CAPSULE | Refills: 0 | Status: SHIPPED | OUTPATIENT
Start: 2023-04-24 | End: 2023-05-04

## 2023-04-24 ASSESSMENT — ENCOUNTER SYMPTOMS
EYE PAIN: 0
RHINORRHEA: 1
NAUSEA: 0
WHEEZING: 1
SORE THROAT: 0
VOMITING: 0
COUGH: 1
SHORTNESS OF BREATH: 0
CHEST TIGHTNESS: 1

## 2023-04-24 NOTE — PROGRESS NOTES
[address] WALK-IN  [address]  [address]  [address] [address]  Dept: [phone]  Dept Fax: [phone]    Lakeisha Calix is a 35 y.o. male who presents today for his medical conditions/complaints of   Chief Complaint   Patient presents with    Cough     Start date: 4/19/23  Sx: Mucus production( yellow, green ligh and dark, brown) chest tightness from mucus, congestion , low grade fever   OTC MEDS: Dayquil , night quil          HPI:     /70 (Site: Right Upper Arm, Position: Sitting, Cuff Size: Large Adult)   Pulse 96   Temp 99.3 °F (37.4 °C)   Resp 15   Ht 6' 5\" (1.956 m)   Wt 245 lb (111.1 kg)   SpO2 98%   BMI 29.05 kg/m²       HPI  Pt presented to the clinic today with c/o congestion. This is a new problem. The current episode started 6 days ago. The problem has been unchanged since onset. Associated symptoms include: low grade fever- 99.9, cough-productive with green/brown sputum, chest tightness with coughing, wheezing, runny nose . Pertinent negatives include: No SOB, chest pain, abdominal pain. Pt has tried Dayquil with little improvement. No history of asthma or pneumonia. Past Medical History:   Diagnosis Date    ADHD (attention deficit hyperactivity disorder)         Past Surgical History:   Procedure Laterality Date    APPENDECTOMY      HERNIA REPAIR Left     inguinal hernia repair       Family History   Problem Relation Age of Onset    Other Mother     High Blood Pressure Father        Social History     Tobacco Use    Smoking status: Never    Smokeless tobacco: Former     Types: Chew   Substance Use Topics    Alcohol use: Yes     Comment: socially        Prior to Visit Medications    Medication Sig Taking? Authorizing Provider   azithromycin (ZITHROMAX) 250 MG tablet Take 2 tablets (500 mg) on Day 1, followed by 1 tablet (250 mg) once daily on Days 2 through 5.  Yes KERRY Ward - CNP

## 2023-05-15 ENCOUNTER — OFFICE VISIT (OUTPATIENT)
Dept: FAMILY MEDICINE CLINIC | Age: 33
End: 2023-05-15
Payer: COMMERCIAL

## 2023-05-15 ENCOUNTER — HOSPITAL ENCOUNTER (OUTPATIENT)
Age: 33
Discharge: HOME | End: 2023-05-15
Payer: COMMERCIAL

## 2023-05-15 VITALS
WEIGHT: 232.6 LBS | SYSTOLIC BLOOD PRESSURE: 122 MMHG | RESPIRATION RATE: 16 BRPM | DIASTOLIC BLOOD PRESSURE: 76 MMHG | HEART RATE: 118 BPM | OXYGEN SATURATION: 98 % | BODY MASS INDEX: 27.58 KG/M2

## 2023-05-15 DIAGNOSIS — R55 NEAR SYNCOPE: ICD-10-CM

## 2023-05-15 DIAGNOSIS — R42 DIZZINESS: ICD-10-CM

## 2023-05-15 DIAGNOSIS — F41.9 ANXIETY: ICD-10-CM

## 2023-05-15 DIAGNOSIS — R55 NEAR SYNCOPE: Primary | ICD-10-CM

## 2023-05-15 LAB
ERYTHROCYTE [DISTWIDTH] IN BLOOD BY AUTOMATED COUNT: 12.8 % (ref 11.8–14.4)
HCT VFR BLD AUTO: 49 % (ref 40.7–50.3)
HGB BLD-MCNC: 15.8 G/DL (ref 13–17)
MCH RBC QN AUTO: 27.7 PG (ref 25.2–33.5)
MCHC RBC AUTO-ENTMCNC: 32.2 G/DL (ref 28.4–34.8)
MCV RBC AUTO: 85.8 FL (ref 82.6–102.9)
NRBC AUTOMATED: 0 PER 100 WBC
PLATELET # BLD AUTO: 290 K/UL (ref 138–453)
PMV BLD AUTO: 10.9 FL (ref 8.1–13.5)
RBC # BLD AUTO: 5.71 M/UL (ref 4.21–5.77)
WBC OTHER # BLD: 8.4 K/UL (ref 3.5–11.3)

## 2023-05-15 PROCEDURE — 36415 COLL VENOUS BLD VENIPUNCTURE: CPT

## 2023-05-15 PROCEDURE — 99214 OFFICE O/P EST MOD 30 MIN: CPT | Performed by: NURSE PRACTITIONER

## 2023-05-15 PROCEDURE — 84443 ASSAY THYROID STIM HORMONE: CPT

## 2023-05-15 PROCEDURE — 93005 ELECTROCARDIOGRAM TRACING: CPT | Performed by: NURSE PRACTITIONER

## 2023-05-15 PROCEDURE — 85027 COMPLETE CBC AUTOMATED: CPT

## 2023-05-15 PROCEDURE — 80053 COMPREHEN METABOLIC PANEL: CPT

## 2023-05-15 SDOH — ECONOMIC STABILITY: FOOD INSECURITY: WITHIN THE PAST 12 MONTHS, YOU WORRIED THAT YOUR FOOD WOULD RUN OUT BEFORE YOU GOT MONEY TO BUY MORE.: NEVER TRUE

## 2023-05-15 SDOH — ECONOMIC STABILITY: INCOME INSECURITY: HOW HARD IS IT FOR YOU TO PAY FOR THE VERY BASICS LIKE FOOD, HOUSING, MEDICAL CARE, AND HEATING?: NOT HARD AT ALL

## 2023-05-15 SDOH — ECONOMIC STABILITY: HOUSING INSECURITY
IN THE LAST 12 MONTHS, WAS THERE A TIME WHEN YOU DID NOT HAVE A STEADY PLACE TO SLEEP OR SLEPT IN A SHELTER (INCLUDING NOW)?: NO

## 2023-05-15 SDOH — ECONOMIC STABILITY: FOOD INSECURITY: WITHIN THE PAST 12 MONTHS, THE FOOD YOU BOUGHT JUST DIDN'T LAST AND YOU DIDN'T HAVE MONEY TO GET MORE.: NEVER TRUE

## 2023-05-15 ASSESSMENT — PATIENT HEALTH QUESTIONNAIRE - PHQ9
SUM OF ALL RESPONSES TO PHQ QUESTIONS 1-9: 0
1. LITTLE INTEREST OR PLEASURE IN DOING THINGS: 0
SUM OF ALL RESPONSES TO PHQ9 QUESTIONS 1 & 2: 0
2. FEELING DOWN, DEPRESSED OR HOPELESS: 0
SUM OF ALL RESPONSES TO PHQ QUESTIONS 1-9: 0

## 2023-05-15 ASSESSMENT — ENCOUNTER SYMPTOMS
EYE PAIN: 0
RHINORRHEA: 0
VOMITING: 0
SHORTNESS OF BREATH: 0
COUGH: 0
NAUSEA: 0

## 2023-05-15 NOTE — PROGRESS NOTES
[address] WALK-IN  [address] [place] [address]  Dept: [phone]  Dept Fax: [phone]    Jus Ernandez is a 35 y.o. male who presents today for his medical conditions/complaints of   Chief Complaint   Patient presents with    Dizziness     Off and on for a month. Occasional Headaches, blurred vision and ringing in  ears when it happens           HPI:     /76 (Site: Left Upper Arm, Position: Standing, Cuff Size: Large Adult)   Pulse (!) 118   Resp 16   Wt 232 lb 9.6 oz (105.5 kg)   SpO2 98%   BMI 27.58 kg/m²       HPI  Pt presented to the walk in today with c/o dizziness. This has been happening intermittently over the last month, but occurring now more frequently. Pt states yesterday he was sitting down and when he went to stand up he felt a \"head rush. \"  He got dizzy, his vision was blurry, ringing in the ears, sweating, and almost passed out. He sat down and the dizziness lingered for about 5 minutes then all symptoms resolved. He felt like he was going to poop his pants. This can occur randomly. Has had this happen just while sitting or while walking thru the kitchen. Not just going from sitting to standing. He does not know when it will happen. There is no chest pain, palpitations, SOB. He is eating and drinking well. He is concerned he will have an episode while working. He does have anxiety and trouble sleeping at night which has been worsening. Was referred to behavioral health 6 months ago by his PCP, but at the time he thought his anxiety was well controlled. Recently he started working split shifts which is not helping. He would like referral to behavioral health placed and needs FMLA updated. He is not having any dizziness in the office today.        Past Medical History:   Diagnosis Date    ADHD (attention deficit hyperactivity disorder)         Past Surgical History:

## 2023-05-16 LAB
ALBUMIN SERPL-MCNC: 4.6 G/DL (ref 3.5–5.2)
ALBUMIN/GLOB SERPL: 1.6 {RATIO} (ref 1–2.5)
ALP SERPL-CCNC: 66 U/L (ref 40–129)
ALT SERPL-CCNC: 34 U/L (ref 5–41)
ANION GAP SERPL CALCULATED.3IONS-SCNC: 9 MMOL/L (ref 9–17)
AST SERPL-CCNC: 27 U/L
BILIRUB SERPL-MCNC: 1 MG/DL (ref 0.3–1.2)
BUN SERPL-MCNC: 14 MG/DL (ref 6–20)
CALCIUM SERPL-MCNC: 9.7 MG/DL (ref 8.6–10.4)
CHLORIDE SERPL-SCNC: 100 MMOL/L (ref 98–107)
CO2 SERPL-SCNC: 30 MMOL/L (ref 20–31)
CREAT SERPL-MCNC: 1.13 MG/DL (ref 0.7–1.2)
EKG ATRIAL RATE: 90 BPM
EKG P AXIS: 68 DEGREES
EKG P-R INTERVAL: 154 MS
EKG Q-T INTERVAL: 354 MS
EKG QRS DURATION: 90 MS
EKG QTC CALCULATION (BAZETT): 433 MS
EKG R AXIS: 27 DEGREES
EKG T AXIS: 50 DEGREES
EKG VENTRICULAR RATE: 90 BPM
GFR SERPL CREATININE-BSD FRML MDRD: >60 ML/MIN/1.73M2
GLUCOSE SERPL-MCNC: 94 MG/DL (ref 70–99)
POTASSIUM SERPL-SCNC: 4.3 MMOL/L (ref 3.7–5.3)
PROT SERPL-MCNC: 7.4 G/DL (ref 6.4–8.3)
SODIUM SERPL-SCNC: 139 MMOL/L (ref 135–144)
TSH SERPL-ACNC: 0.53 UIU/ML (ref 0.3–5)

## 2023-05-16 PROCEDURE — 93010 ELECTROCARDIOGRAM REPORT: CPT | Performed by: INTERNAL MEDICINE

## 2023-05-19 ENCOUNTER — OFFICE VISIT (OUTPATIENT)
Dept: PRIMARY CARE CLINIC | Age: 33
End: 2023-05-19
Payer: COMMERCIAL

## 2023-05-19 VITALS
HEIGHT: 77 IN | DIASTOLIC BLOOD PRESSURE: 82 MMHG | OXYGEN SATURATION: 99 % | RESPIRATION RATE: 16 BRPM | BODY MASS INDEX: 28.01 KG/M2 | WEIGHT: 237.2 LBS | SYSTOLIC BLOOD PRESSURE: 122 MMHG | HEART RATE: 83 BPM

## 2023-05-19 DIAGNOSIS — R42 NONSPECIFIC DIZZINESS: Primary | ICD-10-CM

## 2023-05-19 DIAGNOSIS — F41.1 GENERALIZED ANXIETY DISORDER: ICD-10-CM

## 2023-05-19 DIAGNOSIS — I51.7 LEFT ATRIAL ENLARGEMENT: ICD-10-CM

## 2023-05-19 PROCEDURE — 99214 OFFICE O/P EST MOD 30 MIN: CPT | Performed by: PHYSICIAN ASSISTANT

## 2023-05-19 ASSESSMENT — PATIENT HEALTH QUESTIONNAIRE - PHQ9
2. FEELING DOWN, DEPRESSED OR HOPELESS: 1
SUM OF ALL RESPONSES TO PHQ QUESTIONS 1-9: 2
1. LITTLE INTEREST OR PLEASURE IN DOING THINGS: 1
SUM OF ALL RESPONSES TO PHQ9 QUESTIONS 1 & 2: 2
SUM OF ALL RESPONSES TO PHQ QUESTIONS 1-9: 2

## 2023-05-19 ASSESSMENT — ENCOUNTER SYMPTOMS
RHINORRHEA: 0
COUGH: 0
DIARRHEA: 0
SINUS PAIN: 0
NAUSEA: 0
EYE PAIN: 0
ABDOMINAL PAIN: 0
BACK PAIN: 0
CONSTIPATION: 0
SHORTNESS OF BREATH: 0
VOMITING: 0

## 2023-05-19 NOTE — PROGRESS NOTES
importance of hydration, proper sleep, stress relieving activities. 2.  We long discussion about management of anxiety. He defers medication at this time. He is given contact information for counseling and mental health providers in the area. He is to contact the office with any concerns. Return for after ECHO. Orders Placed This Encounter   Procedures    [facility])     Referral Priority:   Routine     Referral Type:   Behavioral Health     Referral Reason:   Specialty Services Required     Referred to Provider:   GINNA Monae     Requested Specialty:   Behavioral Health     Number of Visits Requested:   1    ECHO Complete 2D W Doppler W Color     Standing Status:   Future     Standing Expiration Date:   5/19/2024     Order Specific Question:   Reason for exam:     Answer:   dizziness, ekg findings         Patient given educational materials - see patient instructions. Discussed use, benefit, and side effects of prescribed medications. All patient questions answered. Pt voiced understanding. Reviewed health maintenance. Instructed to continue current medications, diet and exercise. Patient agreed with treatment plan. Follow up as directed. Electronically signed by Dale Saenz PA-C on 5/19/2023 at 7:37 AM    I spent 30 minutes with this patient encounter.

## 2023-06-06 ENCOUNTER — TELEMEDICINE (OUTPATIENT)
Dept: BEHAVIORAL/MENTAL HEALTH CLINIC | Age: 33
End: 2023-06-06
Payer: COMMERCIAL

## 2023-06-06 DIAGNOSIS — F43.22 ADJUSTMENT DISORDER WITH ANXIOUS MOOD: Primary | ICD-10-CM

## 2023-06-06 PROCEDURE — 90791 PSYCH DIAGNOSTIC EVALUATION: CPT | Performed by: COUNSELOR

## 2023-06-06 NOTE — PROGRESS NOTES
ADULT BEHAVIORAL HEALTH ASSESSMENT  Carlos Meek [facility]      Visit Date: 6/6/2023   Time of appointment:  2p   Time spent with Patient: 54 minutes. This is patient's first appointment. Reason for Consult:  New Patient     Referring Provider/PCP:    CHI Montiel PA-C      Pt provided informed consent for the behavioral health program. Discussed with patient model of service to include the limits of confidentiality (i.e. abuse reporting, suicide intervention, etc.) and short-term intervention focused approach. Pt indicated understanding. PRESENTING PROBLEM AND HISTORY  Ermelinda Galaviz is a 35 y.o. male who presents for new evaluation and treatment of  anxiety. He has the following symptoms: depressed mood, decreased appetite, increased appetite, increased sleep, lack of motivation, low self-esteem, isolating self, feelings of worthlessness, anger/irritability, mood swings, having more energy than usual, racing thoughts/flight of ideas, feeling nervous, anxious, or on edge, racing worry thoughts, excessive anxiety and worry about specific stressors, inability to stop or control worry, unexpected panic attacks, feeling afraid something awful might happen, nightmares or flashbacks about an experience that was horrible, frightening, or upsetting, trying hard not to think of a horrible, frightening, or upsetting event, feeling fidgety or restless, and difficulty with attention/concentration. Onset of symptoms was approximately several months ago. Symptoms have been gradually worsening since that time. He denies current suicidal and homicidal ideation. Family history significant for alcoholism. Risk factors: positive family history in  father. He reported he is not interested in anxiety medications at this time. Clt reported his mom is on Prozac and has anxiety and processed how this impacts his MH.  He reported he lost his friend in the [employer] oil fire last year and is unsure if this is playing

## 2023-06-20 ENCOUNTER — TELEMEDICINE (OUTPATIENT)
Dept: BEHAVIORAL/MENTAL HEALTH CLINIC | Age: 33
End: 2023-06-20
Payer: COMMERCIAL

## 2023-06-20 DIAGNOSIS — F43.22 ADJUSTMENT DISORDER WITH ANXIOUS MOOD: Primary | ICD-10-CM

## 2023-06-20 PROCEDURE — 90837 PSYTX W PT 60 MINUTES: CPT | Performed by: COUNSELOR

## 2023-06-20 NOTE — PROGRESS NOTES
ADULT BEHAVIORAL HEALTH FOLLOW UP  Abigail Mota       Visit Date: 6/20/2023   Time of appointment:  1:01 p  Time spent with Patient: 58 minutes. This is patient's third appointment. Reason for Consult: Follow-up and Anxiety     Referring Provider/PCP:    No ref. provider found  Juan Mg PA-C      Pt provided informed consent for the behavioral health program. Discussed with patient model of service to include the limits of confidentiality (i.e. abuse reporting, suicide intervention, etc.) and short-term intervention focused approach. Pt indicated understanding. SUBJECTIVE  Patient was seen via telehealth using [employer] (interactive audio/video). The patient was located in their home (address on file) in [place] and the clinician was located in [address] [place] [address]. Patient verbally consented to telehealth and indicated that they were in a private location to accommodate confidentialityYoel is a 35 y.o. male who presents for follow up of anxiety. Clt identified since last session his anxiety has increased. Clt reported he saw an eye doctor and they told him he has floaters and that is what is he seeing. He reported he wants a second opinion and was frustrated with the way the appointment went. 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 Therapist provided active listening and validated clt's feelings. Clt reported he is anxious about tomorrow to finding out the results from his test. Therapist validated clt's feelings. Clt identified he is anxious he will be told he has to go back to work due to his increased sense of stress and the work stressors he experienced. Therapist utilized MI to assess level of change and gain a better understanding of barriers to luis going back to work. Clt and therapist reviewed what he is in control of. Clt and therapist reviewed coping skills. Clt identified it is not uncommon for him to have negative self-talk.  Therapist attempted to

## 2023-06-21 ENCOUNTER — OFFICE VISIT (OUTPATIENT)
Dept: PRIMARY CARE CLINIC | Age: 33
End: 2023-06-21
Payer: COMMERCIAL

## 2023-06-21 VITALS
SYSTOLIC BLOOD PRESSURE: 118 MMHG | HEART RATE: 77 BPM | WEIGHT: 247 LBS | OXYGEN SATURATION: 97 % | BODY MASS INDEX: 29.29 KG/M2 | DIASTOLIC BLOOD PRESSURE: 78 MMHG

## 2023-06-21 DIAGNOSIS — F41.1 GENERALIZED ANXIETY DISORDER: ICD-10-CM

## 2023-06-21 DIAGNOSIS — R42 NONSPECIFIC DIZZINESS: Primary | ICD-10-CM

## 2023-06-21 PROCEDURE — 99213 OFFICE O/P EST LOW 20 MIN: CPT | Performed by: PHYSICIAN ASSISTANT

## 2023-06-21 ASSESSMENT — ENCOUNTER SYMPTOMS
SHORTNESS OF BREATH: 0
SINUS PAIN: 0
ABDOMINAL PAIN: 0
EYE PAIN: 0
BACK PAIN: 0
COUGH: 0
VOMITING: 0
DIARRHEA: 0
NAUSEA: 0
RHINORRHEA: 0
CONSTIPATION: 0

## 2023-06-21 NOTE — PROGRESS NOTES
dysfunction. We discussed possible relationship between anxiety and physical symptoms. 2.  I had a long discussion with patient about management of anxiety. He would like to hold off on any medication at this time. I did recommend magnesium at nighttime. We also discussed continuing counseling/therapy. Discussed importance of proper sleep and regular physical activity. Return for after ENT. Orders Placed This Encounter   Procedures    Per Emery MD, Otolaryngology, Emi     Referral Priority:   Routine     Referral Type:   Eval and Treat     Referral Reason:   Specialty Services Required     Referred to Provider:   Yessica Silverman MD     Requested Specialty:   Otolaryngology     Number of Visits Requested:   1         Patient given educational materials - see patient instructions. Discussed use, benefit, and side effects of prescribed medications. All patient questions answered. Pt voiced understanding. Reviewed health maintenance. Instructed to continue current medications, diet and exercise. Patient agreed with treatment plan. Follow up as directed.         Electronically signed by Crystal Foster PA-C on 6/21/2023 at 7:36 AM

## 2023-06-27 ENCOUNTER — TELEMEDICINE (OUTPATIENT)
Dept: BEHAVIORAL/MENTAL HEALTH CLINIC | Age: 33
End: 2023-06-27
Payer: COMMERCIAL

## 2023-06-27 DIAGNOSIS — F43.22 ADJUSTMENT DISORDER WITH ANXIOUS MOOD: Primary | ICD-10-CM

## 2023-06-27 PROCEDURE — 90834 PSYTX W PT 45 MINUTES: CPT | Performed by: COUNSELOR

## 2023-07-12 ENCOUNTER — OFFICE VISIT (OUTPATIENT)
Dept: PRIMARY CARE CLINIC | Age: 33
End: 2023-07-12
Payer: COMMERCIAL

## 2023-07-12 VITALS
WEIGHT: 243 LBS | DIASTOLIC BLOOD PRESSURE: 80 MMHG | OXYGEN SATURATION: 97 % | HEART RATE: 80 BPM | BODY MASS INDEX: 28.82 KG/M2 | SYSTOLIC BLOOD PRESSURE: 118 MMHG

## 2023-07-12 DIAGNOSIS — F33.0 MILD EPISODE OF RECURRENT MAJOR DEPRESSIVE DISORDER (HCC): ICD-10-CM

## 2023-07-12 DIAGNOSIS — R42 NONSPECIFIC DIZZINESS: Primary | ICD-10-CM

## 2023-07-12 DIAGNOSIS — F41.1 GENERALIZED ANXIETY DISORDER: ICD-10-CM

## 2023-07-12 PROCEDURE — 99214 OFFICE O/P EST MOD 30 MIN: CPT | Performed by: PHYSICIAN ASSISTANT

## 2023-07-12 RX ORDER — BUPROPION HYDROCHLORIDE 150 MG/1
150 TABLET ORAL EVERY MORNING
Qty: 30 TABLET | Refills: 1 | Status: SHIPPED | OUTPATIENT
Start: 2023-07-12

## 2023-07-12 ASSESSMENT — ENCOUNTER SYMPTOMS
SINUS PAIN: 0
COUGH: 0
RHINORRHEA: 0
ABDOMINAL PAIN: 0
NAUSEA: 0
DIARRHEA: 0
VOMITING: 0
BACK PAIN: 0
CONSTIPATION: 0
EYE PAIN: 0
SHORTNESS OF BREATH: 0

## 2023-07-12 NOTE — PROGRESS NOTES
Surgical History:   Procedure Laterality Date    APPENDECTOMY      HERNIA REPAIR Left     inguinal hernia repair       Family History   Problem Relation Age of Onset    Other Mother     High Blood Pressure Father        Social History     Tobacco Use    Smoking status: Never    Smokeless tobacco: Former     Types: Chew   Substance Use Topics    Alcohol use: Yes     Comment: socially      Current Outpatient Medications   Medication Sig Dispense Refill    buPROPion (WELLBUTRIN XL) 150 MG extended release tablet Take 1 tablet by mouth every morning 30 tablet 1     No current facility-administered medications for this visit. No Known Allergies    Health Maintenance   Topic Date Due    COVID-19 Vaccine (1) Never done    Varicella vaccine (1 of 2 - 2-dose childhood series) Never done    HIV screen  Never done    Hepatitis C screen  Never done    DTaP/Tdap/Td vaccine (1 - Tdap) Never done    Flu vaccine (1) 08/01/2023    Depression Monitoring  05/19/2024    Hepatitis A vaccine  Aged Out    Hib vaccine  Aged Out    Meningococcal (ACWY) vaccine  Aged Out    Pneumococcal 0-64 years Vaccine  Aged Out    Depression Screen  Discontinued       Subjective:      Review of Systems   Constitutional:  Negative for chills, fatigue and fever. HENT:  Negative for congestion, rhinorrhea and sinus pain. Eyes:  Negative for pain. Respiratory:  Negative for cough and shortness of breath. Cardiovascular:  Negative for chest pain and leg swelling. Gastrointestinal:  Negative for abdominal pain, constipation, diarrhea, nausea and vomiting. Genitourinary:  Negative for difficulty urinating, enuresis and testicular pain. Musculoskeletal:  Negative for arthralgias, back pain and myalgias. Skin:  Negative for rash. Neurological:  Positive for dizziness. Negative for headaches. Psychiatric/Behavioral:  Positive for dysphoric mood. Negative for confusion and sleep disturbance. The patient is nervous/anxious.     All other

## 2023-07-19 NOTE — PROGRESS NOTES
Orthopedic Shoulder Encounter Note     Chief complaint: left shoulder pain    HPI: Jeanie Portillo is a 28 y.o.  left-hand dominant male who presents for evaluation of his left shoulder. He states that in September 2021 he raised someone above his head almost fully extending his arms above his head when instantly his left arm became weak. Subsequently developed pain in the shoulder and weakness being unable to raise arm past shoulder level because of pain. This gradually got better while resting the arm but has been dealing with intermittent pain ever since. The pain is primarily localized to the anterior aspect of the shoulder when present. She usually present with use and when he tries to workout. It does interrupt his sleep as well. At times he describes having numbness and tingling in the hand extending into the forearm and sometimes into his neck while sitting. He has not attempted any treatment for this except for taking anti-inflammatories as outlined below. Previous treatment:    NSAIDs: Ibuprofen, naproxen    Physical Therapy: No    Injections: None    Surgeries: None    Review of Systems:   Constitutional: Negative for fever, chills, sweats. Neurological: Negative for headache, numbness, or weakness. Musculoskeletal: As noted in HPI     Past Medical History  Savannah Ross  has a past medical history of ADHD (attention deficit hyperactivity disorder). Past Surgical History  Savannah Ross  has a past surgical history that includes Appendectomy and hernia repair (Left). Current Medications  Current Outpatient Medications   Medication Sig Dispense Refill    amphetamine-dextroamphetamine (ADDERALL XR) 20 MG extended release capsule Take 1 capsule by mouth every morning for 30 days. 30 capsule 0    cyclobenzaprine (FLEXERIL) 10 MG tablet Take 1 tablet by mouth as needed for Muscle spasms 30 tablet 0     No current facility-administered medications for this visit.        Allergies  Allergies have been reviewed. Sonia Rodriguez has No Known Allergies. Social History  Sonia Rodriguez  reports that he has never smoked. He has quit using smokeless tobacco.  His smokeless tobacco use included chew. He reports current alcohol use. He reports that he does not use drugs. Family History  Yoel's family history includes High Blood Pressure in his father; Other in his mother. Physical Exam:     There were no vitals taken for this visit. Constitutional: Patient is oriented to person, place, and time. Patient appears well-developed and well nourished. Mental Status/psychiatric: Behavior is normal. Thought content normal.  HENT: Negative otherwise noted  Head: Normocephalic and Atraumatic  Nose: Normal  Respiratory/Cardio: Effort normal. No respiratory distress. Shoulder:    Skin: Skin is warm and dry; no swelling or obvious muscular atrophy.    Vasculature: 2+ radial pulses bilaterally  Neuro: Sensation grossly intact to light touch diffusely  Tenderness: Tender to palpation along the anterior aspect of the left shoulder    ROM: (Degrees)    Right   A P   Left   A P    Elevation  165    Elevation  165   Abduction  165    Abduction  165    ER   60    ER   50   IR   T7    IR   T7   90 abd/ER      90 abd/ER     90 abd/IR      90 abd/IR     Crepitation  No    Crepitation No  Dyskenesia  No    Dyskenesia No      Muscle strength:    Right       Left    Deltoid   5    Deltoid   5  Supraspinatus  5    Supraspinatus  5  ER   5    ER   5  IR   5    IR   5    Special tests    Right   Rotator Cuff    Left    n   Painful arc    n   n   Pain with ER    n    n   Neer's     n    n   Hawkin's    n    n   Drop Arm    n  n   Lift off/Belly Press   n  n   ER Lag    n          AC Joint  n   AC tenderness   n  n   Cross-chest adduction  n       Labrum/biceps    n   Wirtz's    y (equivocal)   n   Biceps sheer    y      n   Speed's/Yergason's   y    n   Tenderness Biceps Groove  y    n   Jere's    n         Instability  n   Ant Apprehension   n    n   Post Apprehension   n    n   Ant Load shift    n    n   Post Load shift   n   n   Sulcus     n  n   Generalized Laxity   n  n   Relocation test   n  n   Crank test     n  n   Abdon-superior escape  n       Imaging:  Xrays: 4 views of the left shoulder obtained on 4/11/2022 were independently reviewed  Indications: Left shoulder pain  Findings: Mild narrowing of the glenohumeral joint space with possible mild cystic changes involving the subchondral bone of the glenoid. Very small inferior humeral head osteophyte. No obvious fracture, dislocation or subluxation. Type I acromion. Impression: Left shoulder radiographs with mild degenerative changes involving glenohumeral joint as outlined above. Impression/Plan:     Robert Elise is a 28 y.o. old male with left shoulder pain that appears to be consistent with biceps tendinitis. I had a discussion with the patient today educating him about this and discussed treatment options available to him. I did recommend proceeding conservatively at this time with a cortisone injection as well as an ultrasound-guided cortisone injection into the biceps tendon sheath. He was amenable to this and had a prescription for therapy provided and he was referred to Dr. Humberto Dunn for the cortisone injection. He was instructed to pay attention to how much pain relief he gets from the injection irregardless of how long the effects of the injection lasts. I will see him back my clinic as needed but he may return or call at anytime with persistent or worsening symptoms or with any questions or concerns. This note is created with the assistance of a speech recognition program.  While intending to generate adocument that actually reflects the content of the visit, the document can still have some errors including those of syntax and sound a like substitutions which may escape proof reading.   It such instances, actual meaningcan be extrapolated by SHEYLA (acute kidney injury) contextual diversion.     NA = Not assessed  RTC = Rotator cuff  RCT = Rotator cuff tear  ER = External rotation  IR = Internal rotation  AC = Acromioclavicular  GH = Glenohumeral  n = No  y = Yes

## 2023-08-15 ENCOUNTER — OFFICE VISIT (OUTPATIENT)
Dept: PRIMARY CARE CLINIC | Age: 33
End: 2023-08-15
Payer: COMMERCIAL

## 2023-08-15 VITALS
WEIGHT: 243.8 LBS | OXYGEN SATURATION: 98 % | BODY MASS INDEX: 28.79 KG/M2 | SYSTOLIC BLOOD PRESSURE: 120 MMHG | DIASTOLIC BLOOD PRESSURE: 76 MMHG | RESPIRATION RATE: 16 BRPM | HEART RATE: 85 BPM | HEIGHT: 77 IN

## 2023-08-15 DIAGNOSIS — R42 NONSPECIFIC DIZZINESS: ICD-10-CM

## 2023-08-15 DIAGNOSIS — F43.22 ADJUSTMENT DISORDER WITH ANXIOUS MOOD: Primary | ICD-10-CM

## 2023-08-15 PROBLEM — F41.1 GENERALIZED ANXIETY DISORDER: Status: ACTIVE | Noted: 2023-08-15

## 2023-08-15 PROCEDURE — 99213 OFFICE O/P EST LOW 20 MIN: CPT | Performed by: PHYSICIAN ASSISTANT

## 2023-08-15 ASSESSMENT — ENCOUNTER SYMPTOMS
SINUS PAIN: 0
NAUSEA: 0
ABDOMINAL PAIN: 0
BACK PAIN: 0
DIARRHEA: 0
RHINORRHEA: 0
SHORTNESS OF BREATH: 0
EYE PAIN: 0
CONSTIPATION: 0
VOMITING: 0
COUGH: 0

## 2023-08-15 ASSESSMENT — PATIENT HEALTH QUESTIONNAIRE - PHQ9
2. FEELING DOWN, DEPRESSED OR HOPELESS: 1
8. MOVING OR SPEAKING SO SLOWLY THAT OTHER PEOPLE COULD HAVE NOTICED. OR THE OPPOSITE, BEING SO FIGETY OR RESTLESS THAT YOU HAVE BEEN MOVING AROUND A LOT MORE THAN USUAL: 0
3. TROUBLE FALLING OR STAYING ASLEEP: 1
10. IF YOU CHECKED OFF ANY PROBLEMS, HOW DIFFICULT HAVE THESE PROBLEMS MADE IT FOR YOU TO DO YOUR WORK, TAKE CARE OF THINGS AT HOME, OR GET ALONG WITH OTHER PEOPLE: 0
4. FEELING TIRED OR HAVING LITTLE ENERGY: 1
SUM OF ALL RESPONSES TO PHQ9 QUESTIONS 1 & 2: 2
SUM OF ALL RESPONSES TO PHQ QUESTIONS 1-9: 4
SUM OF ALL RESPONSES TO PHQ QUESTIONS 1-9: 4
9. THOUGHTS THAT YOU WOULD BE BETTER OFF DEAD, OR OF HURTING YOURSELF: 0
6. FEELING BAD ABOUT YOURSELF - OR THAT YOU ARE A FAILURE OR HAVE LET YOURSELF OR YOUR FAMILY DOWN: 0
SUM OF ALL RESPONSES TO PHQ QUESTIONS 1-9: 4
SUM OF ALL RESPONSES TO PHQ QUESTIONS 1-9: 4
1. LITTLE INTEREST OR PLEASURE IN DOING THINGS: 1
7. TROUBLE CONCENTRATING ON THINGS, SUCH AS READING THE NEWSPAPER OR WATCHING TELEVISION: 0
5. POOR APPETITE OR OVEREATING: 0

## 2023-08-29 ENCOUNTER — PATIENT MESSAGE (OUTPATIENT)
Dept: PRIMARY CARE CLINIC | Age: 33
End: 2023-08-29

## 2023-08-29 ENCOUNTER — HOSPITAL ENCOUNTER (OUTPATIENT)
Dept: MRI IMAGING | Age: 33
Discharge: HOME | End: 2023-08-31
Attending: OTOLARYNGOLOGY
Payer: COMMERCIAL

## 2023-08-29 DIAGNOSIS — G47.09 OTHER INSOMNIA: ICD-10-CM

## 2023-08-29 DIAGNOSIS — R42 DIZZINESS AND GIDDINESS: ICD-10-CM

## 2023-08-29 PROCEDURE — 70553 MRI BRAIN STEM W/O & W/DYE: CPT

## 2023-08-29 PROCEDURE — A9579 GAD-BASE MR CONTRAST NOS,1ML: HCPCS | Performed by: OTOLARYNGOLOGY

## 2023-08-29 PROCEDURE — 6360000004 HC RX CONTRAST MEDICATION: Performed by: OTOLARYNGOLOGY

## 2023-08-29 RX ORDER — TRAZODONE HYDROCHLORIDE 50 MG/1
TABLET ORAL
Qty: 30 TABLET | Refills: 2 | Status: SHIPPED | OUTPATIENT
Start: 2023-08-29

## 2023-08-29 RX ADMIN — GADOTERIDOL 20 ML: 279.3 INJECTION, SOLUTION INTRAVENOUS at 17:55

## 2023-09-10 DIAGNOSIS — F41.1 GENERALIZED ANXIETY DISORDER: ICD-10-CM

## 2023-09-10 DIAGNOSIS — F33.0 MILD EPISODE OF RECURRENT MAJOR DEPRESSIVE DISORDER (HCC): ICD-10-CM

## 2023-09-11 RX ORDER — BUPROPION HYDROCHLORIDE 150 MG/1
150 TABLET ORAL EVERY MORNING
Qty: 30 TABLET | Refills: 1 | Status: SHIPPED | OUTPATIENT
Start: 2023-09-11

## 2023-09-12 DIAGNOSIS — F33.0 MILD EPISODE OF RECURRENT MAJOR DEPRESSIVE DISORDER (HCC): ICD-10-CM

## 2023-09-12 DIAGNOSIS — F41.1 GENERALIZED ANXIETY DISORDER: ICD-10-CM

## 2023-09-12 RX ORDER — BUPROPION HYDROCHLORIDE 150 MG/1
150 TABLET ORAL EVERY MORNING
Qty: 30 TABLET | Refills: 1 | OUTPATIENT
Start: 2023-09-12

## 2023-09-14 ENCOUNTER — OFFICE VISIT (OUTPATIENT)
Dept: PRIMARY CARE CLINIC | Age: 33
End: 2023-09-14
Payer: COMMERCIAL

## 2023-09-14 VITALS
BODY MASS INDEX: 29.78 KG/M2 | RESPIRATION RATE: 16 BRPM | HEIGHT: 77 IN | SYSTOLIC BLOOD PRESSURE: 124 MMHG | DIASTOLIC BLOOD PRESSURE: 84 MMHG | HEART RATE: 69 BPM | OXYGEN SATURATION: 98 % | WEIGHT: 252.2 LBS

## 2023-09-14 DIAGNOSIS — R42 NONSPECIFIC DIZZINESS: ICD-10-CM

## 2023-09-14 DIAGNOSIS — M79.10 MUSCLE PAIN: ICD-10-CM

## 2023-09-14 DIAGNOSIS — F33.0 MILD EPISODE OF RECURRENT MAJOR DEPRESSIVE DISORDER (HCC): ICD-10-CM

## 2023-09-14 DIAGNOSIS — F41.1 GENERALIZED ANXIETY DISORDER: Primary | ICD-10-CM

## 2023-09-14 PROCEDURE — 99214 OFFICE O/P EST MOD 30 MIN: CPT | Performed by: PHYSICIAN ASSISTANT

## 2023-09-14 RX ORDER — CYCLOBENZAPRINE HCL 10 MG
10 TABLET ORAL 2 TIMES DAILY PRN
Qty: 10 TABLET | Refills: 0 | Status: SHIPPED | OUTPATIENT
Start: 2023-09-14 | End: 2023-09-19

## 2023-09-14 ASSESSMENT — PATIENT HEALTH QUESTIONNAIRE - PHQ9
SUM OF ALL RESPONSES TO PHQ QUESTIONS 1-9: 0
9. THOUGHTS THAT YOU WOULD BE BETTER OFF DEAD, OR OF HURTING YOURSELF: 0
10. IF YOU CHECKED OFF ANY PROBLEMS, HOW DIFFICULT HAVE THESE PROBLEMS MADE IT FOR YOU TO DO YOUR WORK, TAKE CARE OF THINGS AT HOME, OR GET ALONG WITH OTHER PEOPLE: 0
8. MOVING OR SPEAKING SO SLOWLY THAT OTHER PEOPLE COULD HAVE NOTICED. OR THE OPPOSITE, BEING SO FIGETY OR RESTLESS THAT YOU HAVE BEEN MOVING AROUND A LOT MORE THAN USUAL: 0
7. TROUBLE CONCENTRATING ON THINGS, SUCH AS READING THE NEWSPAPER OR WATCHING TELEVISION: 0
SUM OF ALL RESPONSES TO PHQ9 QUESTIONS 1 & 2: 0
1. LITTLE INTEREST OR PLEASURE IN DOING THINGS: 0
3. TROUBLE FALLING OR STAYING ASLEEP: 0
2. FEELING DOWN, DEPRESSED OR HOPELESS: 0
5. POOR APPETITE OR OVEREATING: 0
4. FEELING TIRED OR HAVING LITTLE ENERGY: 0
SUM OF ALL RESPONSES TO PHQ QUESTIONS 1-9: 0
6. FEELING BAD ABOUT YOURSELF - OR THAT YOU ARE A FAILURE OR HAVE LET YOURSELF OR YOUR FAMILY DOWN: 0

## 2023-09-14 ASSESSMENT — ENCOUNTER SYMPTOMS
VOMITING: 0
SINUS PAIN: 0
ABDOMINAL PAIN: 0
NAUSEA: 0
COUGH: 0
BACK PAIN: 1
DIARRHEA: 0
SHORTNESS OF BREATH: 0
EYE PAIN: 0
RHINORRHEA: 0
CONSTIPATION: 0

## 2023-09-14 NOTE — PROGRESS NOTES
[address]  PRIMARY CARE  [address]  [facility] [place] [address]  Dept: [phone]  Dept Fax: [phone]    Susy Colbert is a 35 y.o. male who presents today for his medical conditions/complaints as noted below. Chief Complaint   Patient presents with    Anxiety     Noticing dizziness has gone away since last Tuesday, has been seeing therapist weekly since last OV       HPI:     Patient presents to the office for follow-up on anxiety and dizziness. He reports he has not had dizziness for over 1 week. He feels that his mental health is gradually improving and anxiety is better controlled. He is following with therapist once weekly. He is currently taking trazodone at night and Wellbutrin in the day. He is sleeping very well with trazodone. Since starting Wellbutrin he has noticed some decrease in emotions, but no other side effects. He feels that his depression is much better. He is interested in activities. He feels ready to return to work. He is less worried now that his work-up with ENT is negative. We reviewed ENT progress note. He does request refill of Flexeril. The other day he was doing squats and now has some muscle tightness in his back. Denies sharp pain. No radiation of pain. No numbness or tingling. No other concerns or complaints. BP stable. Weight slightly increased from last office visit.         No results found for: \"LABA1C\"          ( goal A1C is < 7)   No components found for: \"LABMICR\"  LDL Cholesterol (mg/dL)   Date Value   07/08/2022 112       (goal LDL is <100)   AST (U/L)   Date Value   05/15/2023 27     ALT (U/L)   Date Value   05/15/2023 34     BUN (mg/dL)   Date Value   05/15/2023 14     BP Readings from Last 3 Encounters:   09/14/23 124/84   08/15/23 120/76   07/12/23 118/80          (goal 120/80)    Past Medical History:   Diagnosis Date    ADHD (attention deficit hyperactivity disorder)       Past Surgical

## 2023-10-12 ENCOUNTER — TELEMEDICINE (OUTPATIENT)
Dept: PRIMARY CARE CLINIC | Age: 33
End: 2023-10-12

## 2023-10-12 DIAGNOSIS — G47.09 OTHER INSOMNIA: ICD-10-CM

## 2023-10-12 DIAGNOSIS — M79.10 MUSCLE PAIN: ICD-10-CM

## 2023-10-12 DIAGNOSIS — F43.22 ADJUSTMENT DISORDER WITH ANXIOUS MOOD: Primary | ICD-10-CM

## 2023-10-12 PROCEDURE — 99213 OFFICE O/P EST LOW 20 MIN: CPT | Performed by: PHYSICIAN ASSISTANT

## 2023-10-12 RX ORDER — BUPROPION HYDROCHLORIDE 150 MG/1
150 TABLET ORAL EVERY MORNING
Qty: 90 TABLET | Refills: 1 | Status: SHIPPED | OUTPATIENT
Start: 2023-10-12

## 2023-10-12 RX ORDER — CYCLOBENZAPRINE HCL 10 MG
10 TABLET ORAL 2 TIMES DAILY PRN
Qty: 20 TABLET | Refills: 0 | Status: SHIPPED | OUTPATIENT
Start: 2023-10-12 | End: 2023-10-22

## 2023-10-12 ASSESSMENT — ENCOUNTER SYMPTOMS
SHORTNESS OF BREATH: 0
ABDOMINAL PAIN: 0
RHINORRHEA: 0
SINUS PAIN: 0
NAUSEA: 0
COUGH: 0
EYE PAIN: 0
DIARRHEA: 0
CONSTIPATION: 0
VOMITING: 0
BACK PAIN: 0

## 2023-10-12 ASSESSMENT — PATIENT HEALTH QUESTIONNAIRE - PHQ9
SUM OF ALL RESPONSES TO PHQ QUESTIONS 1-9: 0
5. POOR APPETITE OR OVEREATING: 0
1. LITTLE INTEREST OR PLEASURE IN DOING THINGS: 0
4. FEELING TIRED OR HAVING LITTLE ENERGY: 0
SUM OF ALL RESPONSES TO PHQ9 QUESTIONS 1 & 2: 0
SUM OF ALL RESPONSES TO PHQ QUESTIONS 1-9: 0
2. FEELING DOWN, DEPRESSED OR HOPELESS: 0
10. IF YOU CHECKED OFF ANY PROBLEMS, HOW DIFFICULT HAVE THESE PROBLEMS MADE IT FOR YOU TO DO YOUR WORK, TAKE CARE OF THINGS AT HOME, OR GET ALONG WITH OTHER PEOPLE: 0
8. MOVING OR SPEAKING SO SLOWLY THAT OTHER PEOPLE COULD HAVE NOTICED. OR THE OPPOSITE, BEING SO FIGETY OR RESTLESS THAT YOU HAVE BEEN MOVING AROUND A LOT MORE THAN USUAL: 0
9. THOUGHTS THAT YOU WOULD BE BETTER OFF DEAD, OR OF HURTING YOURSELF: 0
3. TROUBLE FALLING OR STAYING ASLEEP: 0
SUM OF ALL RESPONSES TO PHQ QUESTIONS 1-9: 0
7. TROUBLE CONCENTRATING ON THINGS, SUCH AS READING THE NEWSPAPER OR WATCHING TELEVISION: 0
SUM OF ALL RESPONSES TO PHQ QUESTIONS 1-9: 0
6. FEELING BAD ABOUT YOURSELF - OR THAT YOU ARE A FAILURE OR HAVE LET YOURSELF OR YOUR FAMILY DOWN: 0

## 2023-10-12 NOTE — PROGRESS NOTES
[facility] [place] [address]  Provider was located at [facility] ([facility]): [address]  [address] [facility] ,[facility] [address]  [place],  [address] [facility]        Total time spent on this encounter: Not billed by time    --Brian Mcbride PA-C on 10/12/2023 at 11:10 AM    An electronic signature was used to authenticate this note.

## 2023-11-10 DIAGNOSIS — G47.09 OTHER INSOMNIA: ICD-10-CM

## 2023-11-10 RX ORDER — TRAZODONE HYDROCHLORIDE 50 MG/1
TABLET ORAL
Qty: 30 TABLET | Refills: 2 | Status: SHIPPED | OUTPATIENT
Start: 2023-11-10

## 2024-01-04 NOTE — PATIENT INSTRUCTIONS
Patient Education        Low Back Contusion: Care Instructions  Your Care Instructions     Contusion is the medical term for a bruise. When you have a low back bruise, it's often caused by a direct blow or an impact, such as falling against a counter or table. Bruises are common sports injuries. Most people think of a bruise as a black-and-blue spot. This happens when small blood vessels get torn and leak blood under the skin. But bones, muscles, and organs can also get bruised. If these deep tissues are damaged, you may not always see a bruise. The doctor will examine your bruise. You may also have tests to make sure you do not have a more serious injury, such as a broken bone or nerve damage. Tests may include X-rays or other imaging tests like a CT scan or MRI. Low back bruises may cause pain and swelling. But if there is no serious damage, they will often get better with home treatment in several days to a few weeks. The doctor has checked you carefully, but problems can develop later. If you notice any problems or new symptoms, get medical treatment right away. Follow-up care is a key part of your treatment and safety. Be sure to make and go to all appointments, and call your doctor if you are having problems. It's also a good idea to know your test results and keep a list of the medicines you take. How can you care for yourself at home? · Put ice or a cold pack on the sore area for 10 to 20 minutes at a time to stop swelling. Put a thin cloth between the ice pack and your skin. · Be safe with medicines. Read and follow all instructions on the label. ? If the doctor gave you a prescription medicine for pain, take it as prescribed. ? If you are not taking a prescription pain medicine, ask your doctor if you can take an over-the-counter medicine. · For the first day or two of pain, take it easy. But as soon as possible, get back to your normal daily life and activities.   · Get gentle exercise, such as walking. Movement keeps your spine flexible and helps your muscles stay strong. When should you call for help? Call 911 anytime you think you may need emergency care. For example, call if:    · You are unable to move a leg at all. Call your doctor now or seek immediate medical care if:    · You have new or worse symptoms in your legs or buttocks. Symptoms may include:  ? Numbness or tingling. ? Weakness. ? Pain.     · You lose bladder or bowel control.     · You have blood in your urine. Watch closely for changes in your health, and be sure to contact your doctor if:    · You do not get better as expected. Where can you learn more? Go to https://[employer]. org and sign in to your [place] account. Enter V337 in the [address] box to learn more about \"Low Back Contusion: Care Instructions. \"     If you do not have an account, please click on the \"Sign Up Now\" link. Current as of: February 26, 2020               Content Version: 12.8  © 2006-2021 [employer]wise[employer] Inc[employer]. Care instructions adapted under license by [facility] ([facility]). If you have questions about a medical condition or this instruction, always ask your healthcare professional. [place] [address] any warranty or liability for your use of this information. normal sinus rhythm

## 2024-03-27 ENCOUNTER — TELEPHONE (OUTPATIENT)
Dept: PRIMARY CARE CLINIC | Age: 34
End: 2024-03-27

## 2024-03-27 NOTE — TELEPHONE ENCOUNTER
Patient called in stating he has been fighting a cold for a few weeks, he said he started feeling better 5 days ago, but now he feels like it is moving into his chest and he would like to know if PCP can do a virtual appt to prescribe medication so it does not turn into pneumonia.     Writer did advise he can submit an e-visit as well on [employer], or come to the walk-in clinic but patient declined walk-in since his immune system is compromised and \"does not want to be around sick people\"     Please advise

## 2024-03-28 ENCOUNTER — TELEMEDICINE (OUTPATIENT)
Dept: PRIMARY CARE CLINIC | Age: 34
End: 2024-03-28

## 2024-03-28 DIAGNOSIS — J40 BRONCHITIS: Primary | ICD-10-CM

## 2024-03-28 DIAGNOSIS — J06.9 UPPER RESPIRATORY TRACT INFECTION, UNSPECIFIED TYPE: ICD-10-CM

## 2024-03-28 PROCEDURE — 99213 OFFICE O/P EST LOW 20 MIN: CPT | Performed by: PHYSICIAN ASSISTANT

## 2024-03-28 RX ORDER — PREDNISONE 20 MG/1
20 TABLET ORAL 2 TIMES DAILY
Qty: 10 TABLET | Refills: 0 | Status: SHIPPED | OUTPATIENT
Start: 2024-03-28 | End: 2024-04-02

## 2024-03-28 RX ORDER — DOXYCYCLINE HYCLATE 100 MG
100 TABLET ORAL 2 TIMES DAILY
Qty: 14 TABLET | Refills: 0 | Status: SHIPPED | OUTPATIENT
Start: 2024-03-28 | End: 2024-04-04

## 2024-03-28 SDOH — ECONOMIC STABILITY: FOOD INSECURITY: WITHIN THE PAST 12 MONTHS, YOU WORRIED THAT YOUR FOOD WOULD RUN OUT BEFORE YOU GOT MONEY TO BUY MORE.: NEVER TRUE

## 2024-03-28 SDOH — ECONOMIC STABILITY: FOOD INSECURITY: WITHIN THE PAST 12 MONTHS, THE FOOD YOU BOUGHT JUST DIDN'T LAST AND YOU DIDN'T HAVE MONEY TO GET MORE.: NEVER TRUE

## 2024-03-28 SDOH — ECONOMIC STABILITY: INCOME INSECURITY: HOW HARD IS IT FOR YOU TO PAY FOR THE VERY BASICS LIKE FOOD, HOUSING, MEDICAL CARE, AND HEATING?: NOT HARD AT ALL

## 2024-03-28 ASSESSMENT — ENCOUNTER SYMPTOMS
ABDOMINAL PAIN: 0
SHORTNESS OF BREATH: 0
VOMITING: 0
BACK PAIN: 0
SINUS PRESSURE: 1
NAUSEA: 0
DIARRHEA: 0
COUGH: 1
EYE PAIN: 0
RHINORRHEA: 0
CONSTIPATION: 0
WHEEZING: 1

## 2024-03-28 ASSESSMENT — PATIENT HEALTH QUESTIONNAIRE - PHQ9
9. THOUGHTS THAT YOU WOULD BE BETTER OFF DEAD, OR OF HURTING YOURSELF: NOT AT ALL
SUM OF ALL RESPONSES TO PHQ QUESTIONS 1-9: 3
SUM OF ALL RESPONSES TO PHQ QUESTIONS 1-9: 3
8. MOVING OR SPEAKING SO SLOWLY THAT OTHER PEOPLE COULD HAVE NOTICED. OR THE OPPOSITE, BEING SO FIGETY OR RESTLESS THAT YOU HAVE BEEN MOVING AROUND A LOT MORE THAN USUAL: NOT AT ALL
5. POOR APPETITE OR OVEREATING: NOT AT ALL
2. FEELING DOWN, DEPRESSED OR HOPELESS: NOT AT ALL
4. FEELING TIRED OR HAVING LITTLE ENERGY: SEVERAL DAYS
3. TROUBLE FALLING OR STAYING ASLEEP: SEVERAL DAYS
SUM OF ALL RESPONSES TO PHQ QUESTIONS 1-9: 3
SUM OF ALL RESPONSES TO PHQ9 QUESTIONS 1 & 2: 0
7. TROUBLE CONCENTRATING ON THINGS, SUCH AS READING THE NEWSPAPER OR WATCHING TELEVISION: SEVERAL DAYS
10. IF YOU CHECKED OFF ANY PROBLEMS, HOW DIFFICULT HAVE THESE PROBLEMS MADE IT FOR YOU TO DO YOUR WORK, TAKE CARE OF THINGS AT HOME, OR GET ALONG WITH OTHER PEOPLE: SOMEWHAT DIFFICULT
1. LITTLE INTEREST OR PLEASURE IN DOING THINGS: NOT AT ALL
6. FEELING BAD ABOUT YOURSELF - OR THAT YOU ARE A FAILURE OR HAVE LET YOURSELF OR YOUR FAMILY DOWN: NOT AT ALL
SUM OF ALL RESPONSES TO PHQ QUESTIONS 1-9: 3

## 2024-03-28 NOTE — PROGRESS NOTES
[facility] [address]  Provider was located at Facility (Appt Dept): [address] [place] [address] [address]  [address]  [place] [id number]        Total time spent on this encounter: Not billed by time    --Bernabe Fernandez PA-C on 3/28/2024 at 8:00 AM    An electronic signature was used to authenticate this note.